# Patient Record
Sex: MALE | Race: WHITE
[De-identification: names, ages, dates, MRNs, and addresses within clinical notes are randomized per-mention and may not be internally consistent; named-entity substitution may affect disease eponyms.]

---

## 2019-08-05 ENCOUNTER — HOSPITAL ENCOUNTER (EMERGENCY)
Dept: HOSPITAL 75 - ER FS | Age: 64
Discharge: HOME | End: 2019-08-05
Payer: COMMERCIAL

## 2019-08-05 VITALS — BODY MASS INDEX: 28.14 KG/M2 | HEIGHT: 69 IN | WEIGHT: 190 LBS

## 2019-08-05 VITALS — SYSTOLIC BLOOD PRESSURE: 138 MMHG | DIASTOLIC BLOOD PRESSURE: 71 MMHG
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
## 2019-08-05 DIAGNOSIS — C79.51: ICD-10-CM

## 2019-08-05 DIAGNOSIS — C64.1: ICD-10-CM

## 2019-08-05 DIAGNOSIS — Z79.82: ICD-10-CM
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
## 2019-08-05 DIAGNOSIS — Z79.02: ICD-10-CM

## 2019-08-05 DIAGNOSIS — S20.211A: Primary | ICD-10-CM

## 2019-08-05 PROCEDURE — 71250 CT THORAX DX C-: CPT

## 2019-08-05 NOTE — ED CHEST PAIN
General


Stated Complaint:  WC CHEST INJURY


Source:  patient


Exam Limitations:  no limitations





History of Present Illness


Date Seen by Provider:  Aug 5, 2019


Time Seen by Provider:  14:36


Initial Comments


This 64-year-old man was working and got caught between a  and the wall. 

He suffered squeeze type injury of the right lower chest. He was not rendered un

conscious but continues to have some pain especially with  breathing and 

movement.


Timing/Duration:  1-3 hours


Severity/Quality:  moderate


Location:  central


Radiation:  no radiation


Activities at Onset:  none


Prior CP/Workup:  no prior chest pain


Associated Symptoms:  denies symptoms





Allergies and Home Medications


Allergies


Coded Allergies:  


     No Known Drug Allergies (Unverified , 12/18/11)





Home Medications


Acetaminophen 325 Mg Tablet, 650 MG PO Q6H PRN, (Reported)


Aspirin 81 Mg Tabec, 81 MG PO DAILY, (Reported)


Clopidogrel 75 Mg Tablet, 75 MG PO DAILY, (Reported)


Lisinopril 5 Mg Tablet, 5 MG PO HS, (Reported)


Metoprolol Succinate 25 Mg Tab.sr.24h, 25 MG PO DAILY, (Reported)


Phenylephrine/Diphenhydramine 1 Each Tablet, 1 EACH PO PRN, (Reported)


Pravastatin Sodium 20 Mg Tablet, 20 MG PO DAILY, (Reported)





Patient Home Medication List


Home Medication List Reviewed:  Yes





Review of Systems


Review of Systems


Constitutional:  no symptoms reported


Respiratory:  No Symptoms Reported, See HPI (patient is tender over his right 

anterior lower chest.)


Cardiovascular:  No Symptoms Reported, See HPI


Gastrointestinal:  No Symptoms Reported, See HPI


Genitourinary:  No Symptoms Reported, See HPI


Musculoskeletal:  see HPI, muscle pain, muscle stiffness, muscle cramps


Skin:  no symptoms reported


Psychiatric/Neurological:  No Symptoms Reported, See HPI


Endocrine:  No Symptoms Reported, See HPI


Hematologic/Lymphatic:  No Symptoms Reported, See HPI





Past Medical-Social-Family Hx


Past Med/Social Hx:  Reviewed Nursing Past Med/Soc Hx


Past Medical History


Reproductive Disorders:  No





Physical Exam


Vital Signs





Vital Signs - First Documented




















Capillary Refill :


Height, Weight, BMI


Height: '"


Weight: lbs. oz. kg;  BMI


Method:


General Appearance:  No Apparent Distress


HEENT:  PERRL/EOMI


Neck:  Full Range of Motion, Normal Inspection, Non Tender


Respiratory:  Lungs Clear, Normal Breath Sounds, No Accessory Muscle Use, Other 

(patient has tenderness to palpation over the right anterior chest. There is a 

erythematous mahin at the lower right costal margin. This red mahin measures about

48 cm by about 3 cm.)


Cardiovascular:  Regular Rate, Rhythm, No Edema, No Gallop


Gastrointestinal:  Normal Bowel Sounds, No Organomegaly, No Pulsatile Mass, Non 

Tender


Rectal:  Deferred


Extremity:  Normal Inspection, Normal Range of Motion, Non Tender


Neurologic/Psychiatric:  Alert, Oriented x3, Normal Mood/Affect


Skin:  Warm/Dry, Erythema (there is an erythematous area over the right lower 

costal margin is mentioned above.)





Progress/Results/Core Measures


Results/Orders


My Orders





Orders - CHLOE ZIMMER MD


Ct Chest Wo (8/5/19 14:34)


Ct Abdomen/Pelvis W Wo (8/5/19 15:45)


Cbc With Automated Diff (8/5/19 15:58)


Comprehensive Metabolic Panel (8/5/19 15:58)





Vital Signs/I&O











 8/5/19 8/5/19





 14:20 14:20


 


Temp 99.0 99.0


 


Pulse 107 107


 


Resp 20 20


 


B/P (MAP) 156/79 (104) 156/79 (104)


 


Pulse Ox 95 95


 


O2 Delivery Room Air Room Air











Progress


Progress Note :  


   Time:  16:03


Progress Note


This 64-year-old man was working and got caught between a  and the wall. 

He suffered squeeze type injury of the right lower chest. He was not rendered 

unconscious but continues to have some pain especially with  breathing and 

movement.


Examination reveals patient has some erythema and tenderness over his anterior 

inferior ribs.


CT scan  of the chest reveals a probable tumor involving the right kidney with 

metastasis to theL 4th rib.


I talked to the patient about doing a CT scan of the abdomen and pelvis with and

without contrast but he did not want to pursue that at this time and wanted to 

follow-up.





Departure


Impression





   Primary Impression:  


   Contusion, chest wall


   Additional Impression:  


   Neoplasm of right kidney


Disposition:  01 HOME, SELF-CARE


Condition:  Unchanged





Departure-Patient Inst.


Referrals:  


LEN SMITH MD (PCP/Family)


Primary Care Physician


Patient Instructions:  Bruised Rib, Kidney Cancer











CHLOE ZIMMER MD             Aug 5, 2019 14:41

## 2019-08-05 NOTE — XMS REPORT
Continuity of Care Document

                             Created on: 2019



Rock NOLAN Chavez

External Reference #: [id number]

: 1955

Sex: Male



Demographics







                          Address                   [address] 

[place]  [address]

 

                          Home Phone                [phone] x

 

                          Preferred Language        Unknown

 

                          Marital Status            Unknown

 

                          Zoroastrianism Affiliation     Unknown

 

                          Race                      Unknown

 

                          Ethnic Group              Unknown





Author







                          Organization              Unknown

 

                          Address                   Unknown

 

                          Phone                     Unavailable



              



Allergies

      





             Active              Description              Code              Type              Severity

                Reaction              Onset              Reported/Identified              Relationship

 to Patient                             Clinical Status        

 

                Yes              No Known Drug Allergies              [id number]              Drug Allergy

              Unknown              N/A                             2011              

                                                 



                  



Medications

      



There is no data.                  



Problems

      





             Date Dx Coded              Attending              Type              Code              Diagnosis

                                        Diagnosed By        

 

             2011                             Ot              305.1              TOBACCO USE DISORDER

                                                 

 

             2011                             Ot              410.71              AC MYOCARDIAL

 INFARCT,SUBENDO INFARCT,IN                       

 

             2011                             Ot              414.01              CORONARY ATHEROSCLEROSIS

 OF NATIVE CORON                                 

 

             2011                             Ot              414.2              CHRONIC TOTAL 

OCCLUSION OF CORONARY ROMINA                       

 

             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

 NEC                                             

 

             2011                             Ot              790.6              ABN BLOOD CHEMISTRY

 NEC                                             

 

             2011                             Ot              V03.82              PROPHYLACTIC 

VACC AGAINST STREPTOCOCCUS                        

 

             2011                             Ot              V04.81              ND FOR PROPHYLACTIC

 VACCIN AND INOCULATI                            

 

             2017                             Ot              305.1              TOBACCO USE DISORDER

                                                 

 

             2017                             Ot              411.1              INTERMED CORONARY

 SYND                                            

 

             2017                             Ot              414.01              CORONARY ATHEROSCLEROSIS

 OF NATIVE CORON                                 

 

             2017                             Ot              V17.49              FAMILY HISTORY

 OF OTHER CARDIOVASCULAR D                       

 

             2017                             Ot              V45.89              POSTSURGICAL 

STATES NEC                                       

 

             2017                             Ot              414.00              CORON ATHEROSCLER

 NOS TYPE VESSEL, NATIV                          

 

             2017                             Ot              424.0              MITRAL VALVE DISORDER

                                                 

 

             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

 NEC                                             

 

             2017                             Ot              240.9              GOITER NOS    

                                                 

 

             2017                             Ot              241.0              NONTOX UNINODULAR

 GOITER                                          

 

                2017              CECE TEJEDA ARNP              Ot              414.01     

                          CORONARY ATHEROSCLEROSIS OF NATIVE CORON                       

 

                2017              CECE TEJEDA ARNP              Ot              780.79     

                          OTH MALAISE FATIGUE                       

 

             2017                             Ot              305.1              TOBACCO USE DISORDER

                                                 

 

             2017                             Ot              411.1              INTERMED CORONARY

 SYND                                            

 

             2017                             Ot              414.01              CORONARY ATHEROSCLEROSIS

 OF NATIVE CORON                                 

 

             2017                             Ot              V17.49              FAMILY HISTORY

 OF OTHER CARDIOVASCULAR D                       

 

             2017                             Ot              V45.89              POSTSURGICAL 

STATES NEC                                       

 

             2017                             Ot              414.00              CORON ATHEROSCLER

 NOS TYPE VESSEL, NATIV                          

 

             2017                             Ot              424.0              MITRAL VALVE DISORDER

                                                 

 

             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

 NEC                                             

 

             2017                             Ot              240.9              GOITER NOS    

                                                 

 

             2017                             Ot              241.0              NONTOX UNINODULAR

 GOITER                                          

 

                2017              BAIMACECE JOHANNA ARNP              Ot              414.01     

                          CORONARY ATHEROSCLEROSIS OF NATIVE CORON                       

 

                2017              OSCRAMA CECE L ARNP              Ot              780.79     

                          OTH MALAISE FATIGUE                       

 

             2018                             Ot              240.9              GOITER NOS    

                                                 

 

             2018                             Ot              241.0              NONTOX UNINODULAR

 GOITER                                          

 

                2018              BAIMA CECE L ARNP              Ot              414.01     

                          CORONARY ATHEROSCLEROSIS OF NATIVE CORON                       

 

                2018              OSCARMA CECE L ARNP              Ot              780.79     

                          OTH MALAISE FATIGUE                       

 

             2018                             Ot              240.9              GOITER NOS    

                                                 

 

             2018                             Ot              241.0              NONTOX UNINODULAR

 GOITER                                          

 

                2018              NIKHIL CECE L ARNP              Ot              414.01     

                          CORONARY ATHEROSCLEROSIS OF NATIVE CORON                       

 

                2018              OSCARMA CECE L ARNP              Ot              780.79     

                          OTH MALAISE FATIGUE                       

 

                2018              DULCE MARIA BATEMAN FACC, SB FACP CCDS              Ot              E66.3

                          OVERWEIGHT                         

 

                2018              DULCE MARIA BATEMAN FACC, ALI FACP CCDS              Ot              E78.4

                          OTHER HYPERLIPIDEMIA                       

 

                2018              DULCE MARIA BATEMAN FACC, ALI FACP CCDS              Ot              I10

                          ESSENTIAL (PRIMARY) HYPERTENSION                       

 

                2018              DULCE MARIA BATEMAN FACC, ALI FACP CCDS              Ot              I25.10

                          ATHSCL HEART DISEASE OF NATIVE CORONARY                        

 

                2018              DULCE MARIA BATEMAN FACC, ALI FACP CCDS              Ot              I49.5

                          SICK SINUS SYNDROME                       

 

                2018              DULCE MARIA BATEMAN FACC, ALI FACP CCDS              Ot              E66.3

                          OVERWEIGHT                         

 

                2018              DULCE MARIA BATEMAN FACC, ALI FACP CCDS              Ot              E78.4

                          OTHER HYPERLIPIDEMIA                       

 

                2018              DULCE MARIA BATEMAN FACC, ALI FACP CCDS              Ot              I10

                          ESSENTIAL (PRIMARY) HYPERTENSION                       

 

                2018              DULCE MARIA BATEMAN FACC, ALI FACP CCDS              Ot              I25.10

                          ATHSCL HEART DISEASE OF NATIVE CORONARY                        

 

                2018              DULCE MARIA BATEMAN FACC, ALI FACP CCDS              Ot              I49.5

                          SICK SINUS SYNDROME                       



                                                                                
                                   



Procedures

      





                Code              Description              Performed By              Performed On     

   

 

                                      00.40                                    PROCEDURE ON SINGLE VESSEL   

                                                    2011        

 

                                      00.45                                    INSERTION OF ONE VASCULAR STENT

                                                    2011        

 

                                      00.66                                    PERCUTANEOUS TRANSLUMINAL CORONARY

 ANGIO                                                  2011        

 

                                      36.07                                    INSRT OF DRUG-ELUTING CORON ARTERY

 STENT                                                  2011        

 

                                      37.22                                    LEFT HEART CARDIAC CATH      

                                                    2011        

 

                                      88.53                                    LT HEART ANGIOCARDIOGRAM     

                                                    2011        

 

                                      88.56                                    CORONAR ARTERIOGR-2 CATH     

                                                    2011        



                              



Results

      



There is no data.              



Encounters

      





                ACCT No.              Visit Date/Time              Discharge              Status      

                Pt. Type              Provider              Facility              Loc./Unit      

                                        Complaint        

 

                [id number]              2019 16:00:00              2019 23:59:59              

CLS              Outpatient              PAUL ROMERO                                            

 

                    [id number]              2018 10:00:00              2018 23:59:59      

                CLS              Preadmit              CECE TEJEDA              [facility]              CARD                      I25.10 CAD        

 

                    [id number]              2018 07:18:00              2018 23:59:59      

                    CLS                 Outpatient              DULCE MARIA BATEMAN FACC, ALI FACP CCDS         

                    [facility]              CARD                I25.10 CAD        



 

                    [id number]              2018 07:30:00              2018 23:59:59      

                    CLS                 Preadmit              DULCE MARIA BATEMAN FACC, ALI FACP CCDS           

                    [facility]              CARD                I25.10 CAD        

 

                    [id number]              2018 08:00:00              2018 23:59:59      

                    CLS                 Preadmit              DULCE MARIA BATEMAN FACC, ALI FACP CCDS           

                    [facility]              CARD                I25.10 CAD        

 

                    [id number]              2013 06:53:00              2013 23:59:59      

                CLS              Outpatient              CECE TEJEDA              [facility]              RAD                       FATIGUE,CAD        

 

                [id number]              2012 10:48:00                                          

             Document Registration                                                                   

 

 

                [id number]              2012 09:40:00                                          

             Document Registration                                                                   

 

 

                [id number]              2012 08:23:00                                          

             Document Registration                                                                   

 

 

                [id number]              2011 12:10:00                                          

             Document Registration                                                                   

 

 

                [id number]              2011 12:10:00                                          

             Document Registration

## 2019-08-05 NOTE — DIAGNOSTIC IMAGING REPORT
PROCEDURE: CT chest without contrast.



TECHNIQUE: Multiple contiguous axial images were obtained through

the chest without the use of intravenous contrast. Auto Exposure

Controls were utilized during the CT exam to meet ALARA standards

for radiation dose reduction. 



INDICATION:  Crush injury to the chest.



COMPARISON: No prior studies are available for comparison.



FINDINGS: No mediastinal hematoma is seen. Great vessel injury

cannot be evaluated due to absence of intravenous contrast. There

are coronary arterial calcifications present. No pericardial or

pleural fluid is identified. No parenchymal contusion or

pneumothorax is seen. There is some atelectasis or scarring in

the right lower lobe. Evaluation of bony structures are without

evidence of an acute fracture. No rib fracture seen, however,

there is a lytic lesion involving the lateral left fourth rib

approximately 11 mm x 21 mm, indeterminate. No other osteolytic

lesions are seen. Upper abdomen does show a solid-appearing mass

involving the right kidney measuring approximately 6 cm.



IMPRESSION:

1. No acute chest findings are seen. 

2. Left fourth rib lytic lesion. In addition, upper abdomen does

demonstrate partial inclusion of a solid mass involving the right

kidney. Combination of findings are highly suspicious for primary

renal neoplasm with potential osteolytic metastasis of the left

fourth rib.



Dictated by: 



  Dictated on workstation # [id number]

## 2019-12-30 ENCOUNTER — HOSPITAL ENCOUNTER (OUTPATIENT)
Dept: HOSPITAL 75 - CARD | Age: 64
End: 2019-12-30
Attending: UROLOGY
Payer: MEDICARE

## 2019-12-30 DIAGNOSIS — E27.8: ICD-10-CM

## 2019-12-30 DIAGNOSIS — N28.1: Primary | ICD-10-CM

## 2019-12-30 DIAGNOSIS — K57.30: ICD-10-CM

## 2019-12-30 DIAGNOSIS — M89.9: ICD-10-CM

## 2019-12-30 DIAGNOSIS — N40.0: ICD-10-CM

## 2019-12-30 LAB
BUN/CREAT SERPL: 10
CREAT SERPL-MCNC: 1.38 MG/DL (ref 0.6–1.3)
GFR SERPLBLD BASED ON 1.73 SQ M-ARVRAT: 52 ML/MIN

## 2019-12-30 PROCEDURE — 82565 ASSAY OF CREATININE: CPT

## 2019-12-30 PROCEDURE — 74178 CT ABD&PLV WO CNTR FLWD CNTR: CPT

## 2019-12-30 PROCEDURE — 84520 ASSAY OF UREA NITROGEN: CPT

## 2019-12-30 PROCEDURE — 78306 BONE IMAGING WHOLE BODY: CPT

## 2019-12-30 PROCEDURE — 36415 COLL VENOUS BLD VENIPUNCTURE: CPT

## 2019-12-30 NOTE — DIAGNOSTIC IMAGING REPORT
INDICATION: 

Renal mass.



EXAMINATION:

Whole body bone scan.



RADIOPHARMACEUTICAL: 

26.8 mCi of technetium 99m MDP was given intravenously.



FINDINGS:

There is increased activity in the right mid iliac wing. This

corresponds to an expansile lytic lesion seen on the CT from

earlier in the day. There is a small focus of increased uptake in

the right posterior 11th rib. There are no corresponding

abnormalities seen on the CT scan. There is a small focus of

increased activity in the right anterior 6th and 7th ribs.



IMPRESSION: 

Abnormal activity in the right ilium, suspicious for metastatic

disease. There is abnormal activity in the right 6th and 7th ribs

which are suspicious for metastatic involvement although

corresponding abnormalities are not seen on the CT. There is also

a small focus of increased activity in the right posterior 11th

rib with no obvious abnormality seen on the CT.



Dictated by: 



  Dictated on workstation # [id number]

## 2019-12-30 NOTE — DIAGNOSTIC IMAGING REPORT
PROCEDURE: CT abdomen and pelvis with and without contrast.



TECHNIQUE: Precontrast acquisitions were acquired through the

abdomen and pelvis. Multiple contiguous axial images were

obtained through the abdomen and pelvis after the administration

of intravenous contrast. Auto Exposure Controls were utilized

during the CT exam to meet ALARA standards for radiation dose

reduction. 



INDICATION: Right renal mass as well as left lower quadrant pain

and flank pain.



COMPARISON: No prior studies are available for comparison.



FINDINGS: Imaging through the lung bases show some minimal

scarring or atelectasis in posterior right lower lobe. The liver

demonstrates generalized low density consistent with hepatic

steatosis. No discrete liver mass is identified. The gallbladder

is unremarkable. No biliary ductal dilatation is seen. The

pancreas and spleen are unremarkable. The right adrenal gland is

unremarkable. Left adrenal gland does contain a nodule arising

from the lateral limb measuring 13 mm in size. This lesion does

demonstrate approximately 64% absolute washout and 49% relative

washout. This is suggestive of an adrenal adenoma. There is a

heterogeneous, mixed solid and cystic mass involving the upper

pole of the right kidney measuring 6.5 cm AP x 6.3 cm transverse

x 6.7 cm cephalocaudal. The IVC appears to be patent. The right

renal vein is not well visualized. Left renal vein is patent. No

definite central retroperitoneal lymphadenopathy is seen. No

mesenteric lymphadenopathy is detected. The small and large bowel

loops are normal caliber. There is no obstruction. There is

diverticulosis of the sigmoid but no evidence of acute

diverticulitis. There is no free fluid or fluid collection

identified. The bladder is unremarkable. The prostate gland is

enlarged. Evaluation of the osseous structures demonstrates a

expansile lytic destructive lesion involving the right iliac

bone. This measures approximately 6.7 x 3.6 x 4.2 cm. This

appears to violate both posterior and anterior cortices of the

right iliac bone. This soft tissue tumor extension into the right

iliac is musculature. No other lytic lesions are seen.



IMPRESSION:

1. Large next solid and cystic mass involving the upper pole of

the right kidney, most consistent with renal cell carcinoma.

There is also a destructive, expansile lytic lesion involving the

right iliac bone with tumor extension into the right iliacus

musculature. This is suggestive of a metastatic lesion.

2. Uncomplicated diverticulosis.

3. Prostatomegaly.

4. Left adrenal nodule. Enhancement kinetics are suggestive of an

adrenal adenoma but follow-up to confirm stability would be

recommended with repeat study in six months.



Dictated by: 



  Dictated on workstation # [id number]

## 2020-04-02 ENCOUNTER — HOSPITAL ENCOUNTER (OUTPATIENT)
Dept: HOSPITAL 75 - RAD | Age: 65
End: 2020-04-02
Attending: INTERNAL MEDICINE
Payer: MEDICARE

## 2020-04-02 DIAGNOSIS — M79.605: Primary | ICD-10-CM

## 2020-04-02 NOTE — DIAGNOSTIC IMAGING REPORT
INDICATION: Left leg pain x1 week.



COMPARISON:  None



TECHNIQUE: Duplex, gray-scale and color-flow imaging of the left

lower extremity venous system was performed. 



FINDINGS: The common femoral vein, superficial femoral vein,

profunda femoris, and popliteal veins are normal.  These vessels

show normal compressibility, color flow, and doppler

augmentation.  The deep calf veins, although not very well seen,

demonstrate no distinct intraluminal thrombus. 



IMPRESSION:  Negative venous Doppler of the left lower extremity.



 



Dictated by: 



  Dictated on workstation # [id number]

## 2020-05-19 ENCOUNTER — HOSPITAL ENCOUNTER (OUTPATIENT)
Dept: HOSPITAL 75 - RAD | Age: 65
End: 2020-05-19
Attending: INTERNAL MEDICINE
Payer: MEDICARE

## 2020-05-19 DIAGNOSIS — C64.1: Primary | ICD-10-CM

## 2020-05-19 DIAGNOSIS — M84.48XA: ICD-10-CM

## 2020-05-19 PROCEDURE — 74177 CT ABD & PELVIS W/CONTRAST: CPT

## 2020-05-19 NOTE — DIAGNOSTIC IMAGING REPORT
EXAMINATION: CT Abdomen and Pelvis with intravenous contrast.



TECHNIQUE: Multiple contiguous axial images were obtained through

the abdomen and pelvis after the uneventful administration of

intravenous contrast. All CT scans use one or more of the

following dose optimizing techniques: automated exposure control,

MA and/or KvP adjustment based on a patient size and exam type,

or iterative reconstruction. 



HISTORY: Right renal cancer.



COMPARISON: 12/30/2019.



FINDINGS: 



Limited views of the lower thorax are unremarkable.



The liver is normal without focal lesion. There is no biliary

ductal dilation. Gallbladder is normal.  Pancreas is normal. 

Spleen is normal. There is an unchanged 13 mm left adrenal

nodule. Again seen is a large right renal mass measuring 6.0 x

6.0 cm, previously 6.1 x 6.2 cm. This is predominantly exophytic

and arises from the interpolar zone of the kidney. There is a

large soft tissue mass arising from the right ilium with a

maximal dimension of 4.9 cm, previously 3.6 cm. There is a

pathologic fracture of the right ilium. There is hypervascularity

of the right iliacus related to direct invasion by the tumor into

this muscle. There is no hydronephrosis. Urinary bladder is

normal.



Visualized bowel is normal in caliber without obstruction or

inflammation. No free fluid or air. No abdominal or pelvic

lymphadenopathy. Aorta is normal in caliber without aneurysm.



There is a new 8 mm sclerotic lesion in the left ilium.



IMPRESSION:



1. Stable large right renal mass.



2. Increase in size of right iliac metastatic lesion with

pathologic fracture and new sclerotic lesion in the left ilium.



Dictated by: 



  Dictated on workstation # [id number]

## 2020-06-01 ENCOUNTER — HOSPITAL ENCOUNTER (OUTPATIENT)
Dept: HOSPITAL 75 - CARD | Age: 65
End: 2020-06-01
Attending: INTERNAL MEDICINE
Payer: MEDICARE

## 2020-06-01 DIAGNOSIS — R91.8: ICD-10-CM

## 2020-06-01 DIAGNOSIS — N28.89: ICD-10-CM

## 2020-06-01 DIAGNOSIS — C61: ICD-10-CM

## 2020-06-01 DIAGNOSIS — E27.8: ICD-10-CM

## 2020-06-01 DIAGNOSIS — M89.9: ICD-10-CM

## 2020-06-01 DIAGNOSIS — C64.1: Primary | ICD-10-CM

## 2020-06-01 DIAGNOSIS — I25.10: ICD-10-CM

## 2020-06-01 PROCEDURE — 71260 CT THORAX DX C+: CPT

## 2020-06-01 PROCEDURE — 78306 BONE IMAGING WHOLE BODY: CPT

## 2020-06-01 RX ADMIN — Medication PRN ML: at 12:16

## 2020-06-01 RX ADMIN — Medication PRN ML: at 12:30

## 2020-06-01 NOTE — DIAGNOSTIC IMAGING REPORT
PROCEDURE: 

CT chest with contrast only.



TECHNIQUE: 

Multiple contiguous axial images were obtained through the chest

after administration of intravenous contrast. Auto Exposure

Controls were utilized during the CT exam to meet ALARA standards

for radiation dose reduction. 



INDICATION:  

Prostate cancer, kidney cancer.



FINDINGS:

The previous CT chest exam of 08/05/2019 failed to show any

evidence for an acute abnormality; however, there was a lytic

lesion involving the left 4th rib. On this exam, that lytic

lesion is again identified. Previously, this area measured 1.1 x

2.1 cm. This lytic lesion now measures 1.5 x 2.1 cm. This finding

should be considered neoplastic until proven otherwise.



There is no other lytic or blastic lesion noted. The Nuclear

Medicine bone scan performed on 12/30/2019 did suggest abnormal

uptake in the right 6th and 7th ribs but there is no clear

evidence for bony destruction in these areas on this exam.



There is no parenchymal lung mass identified to indicate

metastatic disease either. The mild scar formation in the right

lung base seen previously is again evident and no different.

There is no sign of failure, pneumonia, or pleural effusion to

indicate an acute abnormality.



The heart size is stable. Coronary artery calcifications are

again noted. The aorta is not abnormally dilated and there is no

sign of a dissection. The pulmonary arteries were not well

opacified and consequently difficult to assess for pulmonary

embolus. There is no definite defect to suggest a pulmonary

embolus, however.



There is no mediastinal or hilar adenopathy. The thyroid gland is

prominent and the areas of low density in the left lobe of the

thyroid seen previously are again evident and do not appear to

have changed significantly.



The sections through the upper abdomen again show a mass

involving the superior pole of the right kidney. This mass was

not visualized in its entirely but is estimated to be

approximately 6 cm. The small 1 cm exophytic nodule along the

posterior aspect of the left kidney seen previously is again

evident. This seems to be solid as well. Ultrasound would be

recommended for further evaluation. The nodule associated with

the left adrenal gland may be minimally larger than on the

previous CT abdomen/pelvis exam of 08/05/2019. This finding may

represent a benign adrenal nodule. The possibility that this is

secondary to metastatic disease should also be considered. If

further imaging is desired, then either PET/CT or MRI would be

recommended.



IMPRESSION: 

1. There is no acute cardiopulmonary abnormality identified.

There is no parenchymal lung mass noted to suggest metastatic

disease either.



2. The lytic lesion involving the left 4th rib seen previously

has increased in size and consequently should be considered

neoplastic until proven otherwise. The mass associated with the

right kidney seems stable as does the 1 cm exophytic nodule along

the posterior aspect of the left kidney. The nodule associated

with the left adrenal gland may be minimally larger, however.

Additional considerations and recommendations as above.



Dictated by: 



  Dictated on workstation # [id number]

## 2020-06-01 NOTE — DIAGNOSTIC IMAGING REPORT
EXAMINATION: Nuclear medicine bone scan.



INDICATION: Prostate cancer.



TECHNIQUE: This study was performed following administration of

27.1 mCi of 99m technetium MDP. Anterior and posterior whole body

images were obtained.



FINDINGS: The previous nuclear medicine bone scan performed on

12/30/2019 noted abnormal activity involving the right ilium.

This finding was suspicious for metastatic disease. On this exam,

there is still abnormal uptake in this region. There also appears

to be abnormal uptake in the left ischium. This is no different

than on the prior exam as well.



The previous study did show abnormal uptake along the anterior

aspects of the right sixth and seventh ribs. The uptake in this

region on this exam is much less. There is an area of increased

activity involving the left fourth rib in the region of the lytic

lesion seen in this area on the CT chest exam performed in

conjunction with this study.



There is no new area of abnormal uptake to suggest metastatic

disease. However, there is increased activity along the medial

aspect of the left knee joint. This finding was not present on

the prior exam and may be related to trauma to the left knee.

Clinical follow-up is recommended.



Both kidneys do show excretion of the radiotracer.



IMPRESSION:

1. The suspected metastatic foci involving the right ilium and

left ischium seen previously are again evident and no different.

2. There is little if any abnormal uptake involving the right

sixth and seventh ribs. There is persistent uptake of the left

fourth rib, however.

3. The abnormal uptake involving the medial aspect of the left

knee joint may be post-traumatic in nature. Clinical follow-up is

recommended. If further imaging is desired, then MRI would be

recommended.



Dictated by: 



  Dictated on workstation # [id number]

## 2020-07-21 ENCOUNTER — HOSPITAL ENCOUNTER (OUTPATIENT)
Dept: HOSPITAL 75 - CARD | Age: 65
End: 2020-07-21
Attending: INTERNAL MEDICINE
Payer: MEDICARE

## 2020-07-21 DIAGNOSIS — C64.1: Primary | ICD-10-CM

## 2020-07-21 DIAGNOSIS — E27.8: ICD-10-CM

## 2020-07-21 DIAGNOSIS — C79.51: ICD-10-CM

## 2020-07-21 PROCEDURE — 71260 CT THORAX DX C+: CPT

## 2020-07-21 PROCEDURE — 74177 CT ABD & PELVIS W/CONTRAST: CPT

## 2020-07-21 PROCEDURE — 78306 BONE IMAGING WHOLE BODY: CPT

## 2020-07-21 NOTE — DIAGNOSTIC IMAGING REPORT
INDICATION: 

Right renal neoplasm.



TECHNIQUE: 

The patient was administered 25.7 mCi of technetium 99m MDP

intravenously and whole-body imaging was performed after a 3 hour

delay.



COMPARISON: 

Correlation is made with the prior whole body bone scan from

06/01/2020.



FINDINGS:

Uptake of activity by the axial and appendicular skeleton is

noted. There is uptake by both kidneys with excretion into the

urinary bladder. A small area of uptake involving the lateral

aspect of the left approximately 4th rib is unchanged. Minimal

uptake in the lower right ribs posteriorly, best seen on the

posterior view, is stable. Mild degenerative uptake at the left

knee and bilateral shoulders is unchanged. The previously noted

uptake involving the left iliac bone and left supra-acetabular

region appears stable. A lucency at the right iliac bone near the

crest on the posterior view is stable. No new abnormality is

identified.



IMPRESSION: 

Overall stable whole body bone scan when compared with the

examination from 06/01/2020.



Dictated by: 



  Dictated on workstation # [id number]

## 2020-07-21 NOTE — DIAGNOSTIC IMAGING REPORT
EXAMINATION: CT Chest, Abdomen and Pelvis with intravenous

contrast.



TECHNIQUE: Multiple contiguous axial images were obtained through

the chest, abdomen and pelvis after the uneventful administration

of intravenous contrast. All CT scans use one or more of the

following dose optimizing techniques: automated exposure control,

MA and/or KvP adjustment based on a patient size and exam type,

or iterative reconstruction. 



HISTORY: Renal cancer.



COMPARISON: 06/01/2020 and 05/19/2020.



FINDINGS: 



There is no edema or pneumonia. No pleural effusion. No

pneumothorax. No suspicious nodules.



Heart size is normal. There are moderate coronary artery

calcifications. No pericardial effusion. Aorta is normal in

caliber. There is no axillary or supraclavicular lymphadenopathy.

There is no mediastinal lymphadenopathy. Left-sided thyroid

nodule is stable.



The liver is normal without focal lesion. There is no biliary

ductal dilation. Gallbladder is normal.  Pancreas is normal. 

Spleen is normal. There is a stable 13 mm left adrenal nodule.



The right adrenal mass is stable in size measuring 6.3 x 6.1 cm,

previously 6.1 x 6.0 cm. There is no hydronephrosis. Urinary

bladder is normal.



Visualized bowel is normal in caliber without obstruction or

inflammation. There is a fat-containing left inguinal hernia. No

free fluid or air. No abdominal or pelvic lymphadenopathy. Aorta

is normal in caliber without aneurysm.



Soft tissue mass in the right ilium is stable measuring up to 5.2

cm in size. There is a stable tiny left iliac sclerotic lesion.

Left fourth rib lesion is similar to prior study.



IMPRESSION:



1. Stable right renal mass and metastatic lesions in the left

fourth rib and right ilium.



2. Stable indeterminate left adrenal nodule and tiny sclerotic

lesion in the left ilium.



Dictated by: 



  Dictated on workstation # [id number]

## 2020-07-22 ENCOUNTER — HOSPITAL ENCOUNTER (OUTPATIENT)
Dept: HOSPITAL 75 - LABNPT | Age: 65
End: 2020-07-22
Attending: INTERNAL MEDICINE
Payer: MEDICARE

## 2020-07-22 DIAGNOSIS — Z20.828: Primary | ICD-10-CM

## 2020-07-22 PROCEDURE — 87635 SARS-COV-2 COVID-19 AMP PRB: CPT

## 2020-09-18 ENCOUNTER — HOSPITAL ENCOUNTER (OUTPATIENT)
Dept: HOSPITAL 75 - LABNPT | Age: 65
End: 2020-09-18
Attending: INTERNAL MEDICINE
Payer: MEDICARE

## 2020-09-18 DIAGNOSIS — Z11.59: Primary | ICD-10-CM

## 2020-09-18 DIAGNOSIS — C64.1: ICD-10-CM

## 2020-09-18 DIAGNOSIS — Z20.828: ICD-10-CM

## 2020-09-18 DIAGNOSIS — C61: ICD-10-CM

## 2020-09-18 PROCEDURE — 87635 SARS-COV-2 COVID-19 AMP PRB: CPT

## 2020-10-15 ENCOUNTER — HOSPITAL ENCOUNTER (OUTPATIENT)
Dept: HOSPITAL 75 - LABNPT | Age: 65
End: 2020-10-15
Attending: INTERNAL MEDICINE
Payer: MEDICARE

## 2020-10-15 DIAGNOSIS — C64.1: ICD-10-CM

## 2020-10-15 DIAGNOSIS — Z01.812: Primary | ICD-10-CM

## 2020-10-15 DIAGNOSIS — Z20.828: ICD-10-CM

## 2020-10-15 PROCEDURE — 87635 SARS-COV-2 COVID-19 AMP PRB: CPT

## 2020-11-13 ENCOUNTER — HOSPITAL ENCOUNTER (OUTPATIENT)
Dept: HOSPITAL 75 - LABNPT | Age: 65
End: 2020-11-13
Attending: INTERNAL MEDICINE
Payer: MEDICARE

## 2020-11-13 DIAGNOSIS — C64.1: ICD-10-CM

## 2020-11-13 DIAGNOSIS — Z20.828: ICD-10-CM

## 2020-11-13 DIAGNOSIS — C61: Primary | ICD-10-CM

## 2020-11-13 PROCEDURE — 87635 SARS-COV-2 COVID-19 AMP PRB: CPT

## 2020-12-04 ENCOUNTER — HOSPITAL ENCOUNTER (OUTPATIENT)
Dept: HOSPITAL 75 - CARD | Age: 65
End: 2020-12-04
Attending: INTERNAL MEDICINE
Payer: MEDICARE

## 2020-12-04 DIAGNOSIS — C64.1: ICD-10-CM

## 2020-12-04 DIAGNOSIS — C61: Primary | ICD-10-CM

## 2020-12-04 LAB
ALBUMIN SERPL-MCNC: 4.3 GM/DL (ref 3.2–4.5)
ALP SERPL-CCNC: 52 U/L (ref 40–136)
ALT SERPL-CCNC: 14 U/L (ref 0–55)
BILIRUB SERPL-MCNC: 0.3 MG/DL (ref 0.1–1)
BUN/CREAT SERPL: 14
CALCIUM SERPL-MCNC: 9.4 MG/DL (ref 8.5–10.1)
CHLORIDE SERPL-SCNC: 101 MMOL/L (ref 98–107)
CO2 SERPL-SCNC: 27 MMOL/L (ref 21–32)
CREAT SERPL-MCNC: 1.05 MG/DL (ref 0.6–1.3)
GFR SERPLBLD BASED ON 1.73 SQ M-ARVRAT: > 60 ML/MIN
GLUCOSE SERPL-MCNC: 98 MG/DL (ref 70–105)
POTASSIUM SERPL-SCNC: 4.4 MMOL/L (ref 3.6–5)
PROT SERPL-MCNC: 6.9 GM/DL (ref 6.4–8.2)
SODIUM SERPL-SCNC: 138 MMOL/L (ref 135–145)

## 2020-12-04 PROCEDURE — 74177 CT ABD & PELVIS W/CONTRAST: CPT

## 2020-12-04 PROCEDURE — 36415 COLL VENOUS BLD VENIPUNCTURE: CPT

## 2020-12-04 PROCEDURE — 71260 CT THORAX DX C+: CPT

## 2020-12-04 PROCEDURE — 78306 BONE IMAGING WHOLE BODY: CPT

## 2020-12-04 PROCEDURE — 80053 COMPREHEN METABOLIC PANEL: CPT

## 2020-12-04 NOTE — DIAGNOSTIC IMAGING REPORT
INDICATION: Prostate cancer. 



It is compared with study from an outside facility [facility] dated 10/16/2020.



TECHNIQUE: The patient received an intravenous dose 25.7 mCi Tc

99 MDP, whole-body planar imaging then performed 3 hours

following injection.



The study also interpreted in correlation with the CT study

performed 12/04/2020.



There is an abnormal uptake at the left acetabulum at the top of

the right iliac crest in the lateral aspect of the left 4th ribs

these are unchanged from the prior bone scan and correspond to

areas of bony metastatic disease seen on separately performed CT.

 Elsewhere an arthritic pattern of uptake involves the left

greater than right knees and the bilateral shoulders. Elevated

uptake in the lower left tibial shaft is of uncertain etiology,

plain film correlation recommended. Some asymmetric uptake in the

rightward mid thoracic spine corresponds to endplate osteophytes.





IMPRESSION:  

Unchanged from outside study, areas of uptake correspond to a

bony lesions present on earlier CT.  No obvious scintigraphic

evidence for disease progression.



Dictated by: 



  Dictated on workstation # [id number]

## 2020-12-04 NOTE — DIAGNOSTIC IMAGING REPORT
PROCEDURE: CT chest, abdomen, and pelvis with contrast.



TECHNIQUE: Multiple contiguous axial images were obtained through

the chest, abdomen, and pelvis after the administration of

intravenous contrast. Auto Exposure Controls were utilized during

the CT exam to meet ALARA standards for radiation dose reduction.





INDICATION: Renal carcinoma.





CORRELATION STUDY: 07/21/2020



FINDINGS:



CT CHEST:  

Thyroid gland is mildly enlarged. Low-density nodule particularly

inferior pole left lobe. No pathologically enlarged axillary,

mediastinal and/or hilar lymph nodes. Heart size is within normal

limits. There is scattered mild to moderate coronary artery

calcification.



Emphysematous change about the lung parenchyma. Asymmetric

scarring in the right lower lobe. No infiltrate. No definitive

concerning pulmonary mass.



Expansile lateral left 4th rib appears unchanged. There is 

apparent new destructive soft tissue mass at the medial right

scapula, approximately 13 mm in size.





CT ABDOMEN and PELVIS:  

Liver, gallbladder, spleen, pancreas and right adrenal gland

unchanged. 16 mm enhancing lateral left adrenal gland mass.

Large, approximately 6.7 x 6.4 cm enhancing mass superior pole

right kidney, measures slightly larger (previously 6.3 x 6.1 cm)

a few shotty central retroperitoneal lymph nodes are present. No

pathologically enlarged lymphadenopathy. Inferior vena cava and

right renal vein are unremarkable without definitive filling

defect.



Gastrointestinal tract: Colonic diverticulosis without evidence

for acute diverticulitis. Normal appendix. Prominent vasculature

in and around this mass.



Additionally, there is a small exophytic enhancing solid nodule

posterior interpolar region left kidney, 12 mm in size.



Destructive exophytic mass of the right iliac bone with

surrounding soft tissue component appears generally stable.

Additionally, there is loss of the medullary portion the left

acetabulum also suggestive of a likely osseous metastasis. Few

sclerotic lesions the iliac bone.



IMPRESSION:



CT CHEST:

1.  Generally stable metastatic lesion left lateral 4th rib.

2. Apparent new destructive soft tissue mass medial right

scapula.





CT ABDOMEN and PELVIS:

1. Stable to slightly progressive enlargement of a right renal

neoplastic mass.

2. There is also presence of a small exophytic concerning mass

left kidney as well.

3. Destructive right iliac bone metastasis and likely left

superior acetabular metastasis overall generally stable.

4. Stable appearance about avidly enhancing left adrenal gland

mass.



Dictated by: 



  Dictated on workstation # [id number]

## 2020-12-07 ENCOUNTER — HOSPITAL ENCOUNTER (OUTPATIENT)
Dept: HOSPITAL 75 - ONC | Age: 65
LOS: 63 days | Discharge: HOME | End: 2021-02-08
Attending: RADIOLOGY
Payer: MEDICARE

## 2020-12-07 DIAGNOSIS — C64.1: ICD-10-CM

## 2020-12-07 DIAGNOSIS — Z95.5: ICD-10-CM

## 2020-12-07 DIAGNOSIS — Z85.46: ICD-10-CM

## 2020-12-07 DIAGNOSIS — I25.10: ICD-10-CM

## 2020-12-07 DIAGNOSIS — E78.5: ICD-10-CM

## 2020-12-07 DIAGNOSIS — C79.51: Primary | ICD-10-CM

## 2020-12-07 DIAGNOSIS — Z87.01: ICD-10-CM

## 2020-12-07 DIAGNOSIS — I10: ICD-10-CM

## 2020-12-07 PROCEDURE — 77334 RADIATION TREATMENT AID(S): CPT

## 2020-12-07 PROCEDURE — 77417 THER RADIOLOGY PORT IMAGE(S): CPT

## 2020-12-07 PROCEDURE — 77300 RADIATION THERAPY DOSE PLAN: CPT

## 2020-12-07 PROCEDURE — 77295 3-D RADIOTHERAPY PLAN: CPT

## 2020-12-07 PROCEDURE — 77336 RADIATION PHYSICS CONSULT: CPT

## 2020-12-07 PROCEDURE — 99204 OFFICE O/P NEW MOD 45 MIN: CPT

## 2020-12-07 PROCEDURE — 77290 THER RAD SIMULAJ FIELD CPLX: CPT

## 2020-12-10 ENCOUNTER — HOSPITAL ENCOUNTER (OUTPATIENT)
Dept: HOSPITAL 75 - LABNPT | Age: 65
End: 2020-12-10
Attending: INTERNAL MEDICINE
Payer: MEDICARE

## 2020-12-10 DIAGNOSIS — Z20.828: ICD-10-CM

## 2020-12-10 DIAGNOSIS — C64.1: Primary | ICD-10-CM

## 2020-12-10 PROCEDURE — 87635 SARS-COV-2 COVID-19 AMP PRB: CPT

## 2021-01-26 ENCOUNTER — HOSPITAL ENCOUNTER (OUTPATIENT)
Dept: HOSPITAL 75 - LABNPT | Age: 66
End: 2021-01-26
Attending: INTERNAL MEDICINE
Payer: MEDICARE

## 2021-01-26 DIAGNOSIS — C64.1: Primary | ICD-10-CM

## 2021-01-26 DIAGNOSIS — Z20.822: ICD-10-CM

## 2021-01-26 PROCEDURE — 87635 SARS-COV-2 COVID-19 AMP PRB: CPT

## 2021-02-01 ENCOUNTER — HOSPITAL ENCOUNTER (OUTPATIENT)
Dept: HOSPITAL 75 - CARD | Age: 66
End: 2021-02-01
Attending: INTERNAL MEDICINE
Payer: MEDICARE

## 2021-02-01 DIAGNOSIS — C64.1: Primary | ICD-10-CM

## 2021-02-01 DIAGNOSIS — N28.89: ICD-10-CM

## 2021-02-01 PROCEDURE — 71260 CT THORAX DX C+: CPT

## 2021-02-01 PROCEDURE — 74177 CT ABD & PELVIS W/CONTRAST: CPT

## 2021-02-01 PROCEDURE — 78306 BONE IMAGING WHOLE BODY: CPT

## 2021-02-01 RX ADMIN — Medication PRN ML: at 11:39

## 2021-02-01 RX ADMIN — Medication PRN ML: at 12:57

## 2021-02-01 NOTE — DIAGNOSTIC IMAGING REPORT
INDICATION: Renal carcinoma.



TECHNIQUE: Patient was administered 27 mCi technetium 99m MDP

intravenously and whole body imaging was performed after a

three-hour delay.



COMPARISON: Correlation is made with prior whole body bone scan

from 12/04/2020.



FINDINGS: Uptake involving the left acetabulum and lateral left

fourth rib appears similar to prior exam. Expansile lesion in the

right iliac crest is unchanged. Degenerative uptake in the

midthoracic spine as well as bilateral knees is unchanged. No new

foci are seen. There is uptake in the medial aspect of the right

scapula, increased since prior exam.



IMPRESSION: Metastatic lesions in the right scapula, right iliac

crest, as well as left supra-acetabular location as well as

lateral fourth rib. These correspond to the areas noted on CT. No

new foci of osseous metastatic disease are identified.



Dictated by: 



  Dictated on workstation # [id number]

## 2021-02-01 NOTE — DIAGNOSTIC IMAGING REPORT
PROCEDURE: CT chest, abdomen, and pelvis with contrast.



TECHNIQUE: Multiple contiguous axial images were obtained through

the chest, abdomen, and pelvis after the administration of

intravenous contrast. Auto Exposure Controls were utilized during

the CT exam to meet ALARA standards for radiation dose reduction.





INDICATION: Renal cancer, follow-up.



COMPARISON: Correlation is made with prior CT from 12/04/2020.



FINDINGS:



CT chest:



No axillary lymphadenopathy is detected. No mediastinal or hilar

lymphadenopathy is detected. There is no pericardial or pleural

fluid. No pulmonary nodules, masses or infiltrates are seen.

There is some scarring or atelectasis in the posterior right

lower lobe. Expansile lesion involving the left lateral fourth

rib is again noted and appears slightly larger. The mass

involving the inferior and medial aspect of the scapula has

significantly increased in size, now measuring approximately 4.4

x 3.8 cm.



IMPRESSION: No evidence of thoracic lymphadenopathy or pulmonary

metastatic disease. Expansile left fourth rib lesion persists may

be a slightly larger on today's study. In addition, the soft

tissue mass involving the inferior and medial right scapula has

increased in size.



CT abdomen and pelvis:



No discrete liver mass is identified. Gallbladder is

unremarkable. There is no biliary ductal dilatation. The pancreas

and spleen are unremarkable. No adrenal mass is detected. The

large solid mass involving the right kidney measures

approximately 6.9 x 6.6 cm compared with 6.7 x 6.4 cm. Left

kidney again demonstrates a small exophytic hyperdense lesion 12

mm in size, stable. The aorta is non-aneurysmal. No definite

central retroperitoneal or mesenteric lymphadenopathy is seen.

Bowel loops are normal caliber. There is no ascites. There is

diverticulosis of the sigmoid but no evidence of acute

diverticulitis. No definite pelvic lymphadenopathy is seen. The

expansile mass involving the right iliac is again noted. The

lytic lesion in the left supra-acetabular region is again noted

as well. No definite new osteolytic lesion is seen.



IMPRESSION: Overall stable CT abdomen and pelvis since exam from

12/04/2020. A large solid right renal mass as well as

indeterminate exophytic left renal mass appear to be fairly

stable. The expansile osseous lesions involving the right iliac

and left supra-acetabular regions appear to be fairly stable.



Dictated by: 



  Dictated on workstation # [id number]

## 2021-03-31 ENCOUNTER — HOSPITAL ENCOUNTER (EMERGENCY)
Dept: HOSPITAL 75 - ER | Age: 66
Discharge: HOME | End: 2021-03-31
Payer: MEDICARE

## 2021-03-31 VITALS — HEIGHT: 68.9 IN | WEIGHT: 194.01 LBS | BODY MASS INDEX: 28.73 KG/M2

## 2021-03-31 VITALS — SYSTOLIC BLOOD PRESSURE: 148 MMHG | DIASTOLIC BLOOD PRESSURE: 91 MMHG

## 2021-03-31 DIAGNOSIS — Z79.82: ICD-10-CM

## 2021-03-31 DIAGNOSIS — C61: ICD-10-CM

## 2021-03-31 DIAGNOSIS — I10: Primary | ICD-10-CM

## 2021-03-31 DIAGNOSIS — Z79.899: ICD-10-CM

## 2021-03-31 DIAGNOSIS — Z79.02: ICD-10-CM

## 2021-03-31 DIAGNOSIS — Z95.5: ICD-10-CM

## 2021-03-31 DIAGNOSIS — I25.10: ICD-10-CM

## 2021-03-31 LAB
ALBUMIN SERPL-MCNC: 4.7 GM/DL (ref 3.2–4.5)
ALP SERPL-CCNC: 98 U/L (ref 40–136)
ALT SERPL-CCNC: 32 U/L (ref 0–55)
APTT BLD: 30 SEC (ref 24–35)
BASOPHILS # BLD AUTO: 0 10^3/UL (ref 0–0.1)
BASOPHILS NFR BLD AUTO: 0 % (ref 0–10)
BILIRUB SERPL-MCNC: 0.3 MG/DL (ref 0.1–1)
BUN/CREAT SERPL: 10
CALCIUM SERPL-MCNC: 9.5 MG/DL (ref 8.5–10.1)
CHLORIDE SERPL-SCNC: 100 MMOL/L (ref 98–107)
CO2 SERPL-SCNC: 29 MMOL/L (ref 21–32)
CREAT SERPL-MCNC: 1.02 MG/DL (ref 0.6–1.3)
EOSINOPHIL # BLD AUTO: 0.1 10^3/UL (ref 0–0.3)
EOSINOPHIL NFR BLD AUTO: 2 % (ref 0–10)
GFR SERPLBLD BASED ON 1.73 SQ M-ARVRAT: > 60 ML/MIN
GLUCOSE SERPL-MCNC: 97 MG/DL (ref 70–105)
HCT VFR BLD CALC: 49 % (ref 40–54)
HGB BLD-MCNC: 16.5 G/DL (ref 13.3–17.7)
INR PPP: 0.9 (ref 0.8–1.4)
LYMPHOCYTES # BLD AUTO: 1.4 10^3/UL (ref 1–4)
LYMPHOCYTES NFR BLD AUTO: 27 % (ref 12–44)
MAGNESIUM SERPL-MCNC: 2.3 MG/DL (ref 1.6–2.4)
MANUAL DIFFERENTIAL PERFORMED BLD QL: NO
MCH RBC QN AUTO: 31 PG (ref 25–34)
MCHC RBC AUTO-ENTMCNC: 34 G/DL (ref 32–36)
MCV RBC AUTO: 93 FL (ref 80–99)
MONOCYTES # BLD AUTO: 0.5 10^3/UL (ref 0–1)
MONOCYTES NFR BLD AUTO: 10 % (ref 0–12)
NEUTROPHILS # BLD AUTO: 3.1 10^3/UL (ref 1.8–7.8)
NEUTROPHILS NFR BLD AUTO: 60 % (ref 42–75)
PLATELET # BLD: 197 10^3/UL (ref 130–400)
PMV BLD AUTO: 9.3 FL (ref 9–12.2)
POTASSIUM SERPL-SCNC: 3.6 MMOL/L (ref 3.6–5)
PROT SERPL-MCNC: 7.9 GM/DL (ref 6.4–8.2)
PROTHROMBIN TIME: 12.9 SEC (ref 12.2–14.7)
SODIUM SERPL-SCNC: 139 MMOL/L (ref 135–145)
WBC # BLD AUTO: 5.1 10^3/UL (ref 4.3–11)

## 2021-03-31 PROCEDURE — 85730 THROMBOPLASTIN TIME PARTIAL: CPT

## 2021-03-31 PROCEDURE — 84484 ASSAY OF TROPONIN QUANT: CPT

## 2021-03-31 PROCEDURE — 83735 ASSAY OF MAGNESIUM: CPT

## 2021-03-31 PROCEDURE — 85025 COMPLETE CBC W/AUTO DIFF WBC: CPT

## 2021-03-31 PROCEDURE — 85610 PROTHROMBIN TIME: CPT

## 2021-03-31 PROCEDURE — 36415 COLL VENOUS BLD VENIPUNCTURE: CPT

## 2021-03-31 PROCEDURE — 83880 ASSAY OF NATRIURETIC PEPTIDE: CPT

## 2021-03-31 PROCEDURE — 83874 ASSAY OF MYOGLOBIN: CPT

## 2021-03-31 PROCEDURE — 93041 RHYTHM ECG TRACING: CPT

## 2021-03-31 PROCEDURE — 93005 ELECTROCARDIOGRAM TRACING: CPT

## 2021-03-31 PROCEDURE — 80053 COMPREHEN METABOLIC PANEL: CPT

## 2021-03-31 PROCEDURE — 71045 X-RAY EXAM CHEST 1 VIEW: CPT

## 2021-03-31 NOTE — DIAGNOSTIC IMAGING REPORT
INDICATION: Hypertension



Portable chest 7:28 PM



Heart size and pulmonary vascularity are normal. Lungs are clear.

There are no effusions or pneumothoraces. There is focal pleural

thickening left upper lateral chest from an expansile rib lesion.



IMPRESSION: Expansile rib lesion left upper lateral chest. This

is previously seen on a CT chest dated 02/01/2021.



Dictated by: 



  Dictated on workstation # [id number]

## 2021-03-31 NOTE — ED CARDIAC GENERAL
History of Present Illness


General


Chief Complaint:  Cardiac/General Problems


Stated Complaint:  HIGH BLOOD PRESSURE





History of Present Illness


Date Seen by Provider:  Mar 31, 2021


Time Seen by Provider:  19:15


Initial Comments


66-year-old  male presents for hypertension.  He has been monitoring at

home, readings from 180/120 max.  His oncology team at [facility] and his primary 

care provider have recently changed his medication and increased his lisinopril 

to 40 mg and hydrochlorothiazide 25 mg.  He started a new pain medicine for the 

prostate cancer and a side effect is hypertension.  He also noted a headache 

mainly above the right eye today.  He also reports a stressful day at work.  He 

is denying any chest pain, diaphoresis, or nausea/vomiting.


Timing/Duration:  1-2 days


Severity:  mild


NTG SL PTA:  No


ASA po PTA:  Yes


Associated Systoms:  No Chest Pain, No Cough, No Diaphoresis, No Fever/Chills; 

Headaches; No Loss of Appetite, No Malaise, No Nausea/Vomiting, No Rash, No 

Seizure, No Shortness of Air, No Syncope, No Weakness





Allergies and Home Medications


Allergies


Coded Allergies:  


     No Known Drug Allergies (Unverified , 11)





Home Medications


Acetaminophen 325 Mg Tablet, 650 MG PO Q6H PRN, (Reported)


Aspirin 81 Mg Tabec, 81 MG PO DAILY, (Reported)


Clopidogrel 75 Mg Tablet, 75 MG PO DAILY, (Reported)


Lisinopril 5 Mg Tablet, 5 MG PO HS, (Reported)


Metoprolol Succinate 25 Mg Tab.sr.24h, 25 MG PO DAILY, (Reported)


Phenylephrine/Diphenhydramine 1 Each Tablet, 1 EACH PO PRN, (Reported)


Pravastatin Sodium 20 Mg Tablet, 20 MG PO DAILY, (Reported)





Patient Home Medication List


Home Medication List Reviewed:  Yes





Review of Systems


Review of Systems


Constitutional:  no symptoms reported, see HPI


Cardiovascular:  See HPI; Denies Chest Pain, Denies Edema, Denies Irregular 

Heart Rate, Denies Lightheadedness, Denies Palpitations, Denies Syncope; Other 

(Hypertension.)


Gastrointestinal:  No Symptoms Reported, See HPI; Denies Nausea, Denies Vomiting


Psychiatric/Neurological:  See HPI, Headache





All Other Systems Reviewed


Negative Unless Noted:  Yes





Past Medical-Social-Family Hx


Past Med/Social Hx:  Reviewed Nursing Past Med/Soc Hx


Patient Social History


Type Used:  Cigarettes


2nd Hand Smoke Exposure:  No


Recent Hopitalizations:  No





Seasonal Allergies


Seasonal Allergies:  No





Past Medical History


Surgeries:  Yes (neck)


Coronary Stent


Respiratory:  No


Cardiac:  Yes (stent placement 10 years ago)


Coronary Artery Disease


Neurological:  No


Reproductive Disorders:  No


Genitourinary:  No


Gastrointestinal:  No


Musculoskeletal:  No


Endocrine:  No


HEENT:  No


Cancer:  No


Psychosocial:  No


Integumentary:  No


Blood Disorders:  No





Physical Exam


Vital Signs





Vital Signs - First Documented








 3/31/21





 19:15


 


Temp 35.7


 


Pulse 83


 


Resp 16


 


B/P (MAP) 179/108 (131)


 


O2 Delivery Room Air





Capillary Refill :


Height, Weight, BMI


Height: 5'9.00"


Weight: 190lbs. oz. 86.872960jn;  BMI


Method:Stated


General Appearance:  WD/WN, Anxious


HEENT:  PERRL/EOMI, TMs Normal, Normal ENT Inspection, Pharynx Normal


Neck:  Full Range of Motion, Normal Inspection, Non Tender, Supple


Respiratory:  Chest Non Tender, Lungs Clear, Normal Breath Sounds


Cardiovascular:  Regular Rate, Rhythm, No Edema, No JVD, No Murmur


Gastrointestinal:  Normal Bowel Sounds, Non Tender, Soft


Extremity:  Normal Capillary Refill, Normal Inspection, Normal Range of Motion, 

Non Tender, No Calf Tenderness, No Pedal Edema


Neurologic/Psychiatric:  Alert, Oriented x3, No Motor/Sensory Deficits, Normal 

Mood/Affect, CNs II-XII Norm as Tested


Skin:  Normal Color, Warm/Dry





Progress/Results/Core Measures


Results/Orders


Lab Results





Laboratory Tests








Test


 3/31/21


19:26 Range/Units


 


 


White Blood Count


 5.1 


 4.3-11.0


10^3/uL


 


Red Blood Count


 5.27 


 4.30-5.52


10^6/uL


 


Hemoglobin 16.5  13.3-17.7  g/dL


 


Hematocrit 49  40-54  %


 


Mean Corpuscular Volume 93  80-99  fL


 


Mean Corpuscular Hemoglobin 31  25-34  pg


 


Mean Corpuscular Hemoglobin


Concent 34 


 32-36  g/dL





 


Red Cell Distribution Width 12.6  10.0-14.5  %


 


Platelet Count


 197 


 130-400


10^3/uL


 


Mean Platelet Volume 9.3  9.0-12.2  fL


 


Immature Granulocyte % (Auto) 0   %


 


Neutrophils (%) (Auto) 60  42-75  %


 


Lymphocytes (%) (Auto) 27  12-44  %


 


Monocytes (%) (Auto) 10  0-12  %


 


Eosinophils (%) (Auto) 2  0-10  %


 


Basophils (%) (Auto) 0  0-10  %


 


Neutrophils # (Auto)


 3.1 


 1.8-7.8


10^3/uL


 


Lymphocytes # (Auto)


 1.4 


 1.0-4.0


10^3/uL


 


Monocytes # (Auto)


 0.5 


 0.0-1.0


10^3/uL


 


Eosinophils # (Auto)


 0.1 


 0.0-0.3


10^3/uL


 


Basophils # (Auto)


 0.0 


 0.0-0.1


10^3/uL


 


Immature Granulocyte # (Auto)


 0.0 


 0.0-0.1


10^3/uL


 


Prothrombin Time 12.9  12.2-14.7  SEC


 


INR Comment 0.9  0.8-1.4  


 


Activated Partial


Thromboplast Time 30 


 24-35  SEC





 


Sodium Level 139  135-145  MMOL/L


 


Potassium Level 3.6  3.6-5.0  MMOL/L


 


Chloride Level 100    MMOL/L


 


Carbon Dioxide Level 29  21-32  MMOL/L


 


Anion Gap 10  5-14  MMOL/L


 


Blood Urea Nitrogen 10  7-18  MG/DL


 


Creatinine


 1.02 


 0.60-1.30


MG/DL


 


Estimat Glomerular Filtration


Rate > 60 


  





 


BUN/Creatinine Ratio 10   


 


Glucose Level 97    MG/DL


 


Calcium Level 9.5  8.5-10.1  MG/DL


 


Corrected Calcium   8.5-10.1  MG/DL


 


Magnesium Level 2.3  1.6-2.4  MG/DL


 


Total Bilirubin 0.3  0.1-1.0  MG/DL


 


Aspartate Amino Transf


(AST/SGOT) 28 


 5-34  U/L





 


Alanine Aminotransferase


(ALT/SGPT) 32 


 0-55  U/L





 


Alkaline Phosphatase 98    U/L


 


Myoglobin


 99.8 H


 10.0-92.0


NG/ML


 


Troponin I < 0.028  <0.028  NG/ML


 


B-Type Natriuretic Peptide 22.9  <100.0  PG/ML


 


Total Protein 7.9  6.4-8.2  GM/DL


 


Albumin 4.7 H 3.2-4.5  GM/DL








My Orders





Orders - KYREE REN


Cbc With Automated Diff (3/31/21 19:23)


Magnesium (3/31/21 19:23)


Chest 1 View, Ap/Pa Only (3/31/21 19:23)


Ekg Tracing (3/31/21 19:23)


Comprehensive Metabolic Panel (3/31/21 19:23)


Myoglobin Serum (3/31/21 19:23)


Protime With Inr (3/31/21 19:23)


Partial Thromboplastin Time (3/31/21 19:23)


Monitor-Rhythm Ecg Trace Only (3/31/21 19:23)


Ed Iv/Invasive Line Start (3/31/21 19:23)


BNP (3/31/21 19:23)


Troponin I (3/31/21 19:23)


Acetaminophen Tablet/Caplet (Tylenol  T (3/31/21 20:00)





Vital Signs/I&O











 3/31/21





 19:15


 


Temp 35.7


 


Pulse 83


 


Resp 16


 


B/P (MAP) 179/108 (131)


 


O2 Delivery Room Air











Progress


Progress Note :  


   Time:  19:15


Progress Note


Patient seen and evaluated, initial blood pressure 180/120.  Will get labs, EKG,

and chest x-ray.


 Second blood pressure reading 150/90.  Will give Tylenol 650 mg for 

headache.


 patient reports headache has improved and declined the Tylenol.  Blood 

pressure 142/80.  Awaiting all labs and will plan for discharge.


 all labs within normal limits.  Patient continues to deny any symptoms at 

this time.  Blood pressure has normalized.  Discharge instructions and return 

precautions reviewed.


Initial ECG Impression Date:  Mar 31, 2021


Initial ECG Impression Time:  19:25


Initial ECG Rate:  78


Initial ECG Rhythm:  Normal Sinus


Initial ECG Intervals:  Normal


Initial ECG Intervals


, QRSD 100, , QTc 454.  Axis A 83, QRS 41, T 37.


Initial ECG Impression:  Normal





Diagnostic Imaging





   Diagonstic Imaging:  Xray


   Plain Films/CT/US/NM/MRI:  chest


Comments


NAME:   ROCK NOLAN VIDES


[facility] REC#:   [id number]


ACCOUNT#:   [id number]


PT STATUS:   REG ER


:   1955


PHYSICIAN:   KYREE REN


ADMIT DATE:   21/ER


                                  ***Signed***


Date of Exam:21





CHEST 1 VIEW, AP/PA ONLY








INDICATION: Hypertension





Portable chest 7:28 PM





Heart size and pulmonary vascularity are normal. Lungs are clear.


There are no effusions or pneumothoraces. There is focal pleural


thickening left upper lateral chest from an expansile rib lesion.





IMPRESSION: Expansile rib lesion left upper lateral chest. This


is previously seen on a CT chest dated 2021.





Dictated by: 





  Dictated on workstation # [id number]








Dict:   21


Trans:   21


CVB [id number]





Interpreted by:     GIACOMO SIMMONS MD


Electronically signed by: GIACOMO SIMMONS MD 21


   Reviewed:  Reviewed by Me





Departure


Impression





   Primary Impression:  


   Hypertension


   Qualified Codes:  I10 - Essential (primary) hypertension


Disposition:   HOME, SELF-CARE


Condition:  Improved





Departure-Patient Inst.


Decision time for Depature:  20:20


Referrals:  


JULIANA SALOMON MD (PCP/Family)


Primary Care Physician


Patient Instructions:  High Blood Pressure (DC)





Add. Discharge Instructions:  


Continue to take your home medications as prescribed.


Follow-up with your primary care provider or oncologist regarding your pain 

medication and blood pressure medicines.


Return to the emergency department for new, urgent healthcare needs. 





All discharge instructions reviewed with patient and/or family. Voiced 

understanding.











KYREE REN                  Mar 31, 2021 19:33

## 2021-05-04 ENCOUNTER — HOSPITAL ENCOUNTER (OUTPATIENT)
Dept: HOSPITAL 75 - RAD | Age: 66
End: 2021-05-04
Attending: INTERNAL MEDICINE
Payer: MEDICARE

## 2021-05-04 DIAGNOSIS — N28.89: ICD-10-CM

## 2021-05-04 DIAGNOSIS — C64.1: Primary | ICD-10-CM

## 2021-05-04 DIAGNOSIS — M89.9: ICD-10-CM

## 2021-05-04 DIAGNOSIS — M75.91: ICD-10-CM

## 2021-05-04 LAB
ALBUMIN SERPL-MCNC: 3.9 GM/DL (ref 3.2–4.5)
ALP SERPL-CCNC: 92 U/L (ref 40–136)
ALT SERPL-CCNC: 37 U/L (ref 0–55)
BASOPHILS # BLD AUTO: 0 10^3/UL (ref 0–0.1)
BASOPHILS NFR BLD AUTO: 0 % (ref 0–10)
BILIRUB SERPL-MCNC: 0.4 MG/DL (ref 0.1–1)
BUN/CREAT SERPL: 14
CALCIUM SERPL-MCNC: 9.1 MG/DL (ref 8.5–10.1)
CHLORIDE SERPL-SCNC: 101 MMOL/L (ref 98–107)
CO2 SERPL-SCNC: 29 MMOL/L (ref 21–32)
CREAT SERPL-MCNC: 1.03 MG/DL (ref 0.6–1.3)
EOSINOPHIL # BLD AUTO: 0.2 10^3/UL (ref 0–0.3)
EOSINOPHIL NFR BLD AUTO: 4 % (ref 0–10)
GFR SERPLBLD BASED ON 1.73 SQ M-ARVRAT: > 60 ML/MIN
GLUCOSE SERPL-MCNC: 97 MG/DL (ref 70–105)
HCT VFR BLD CALC: 48 % (ref 40–54)
HGB BLD-MCNC: 15.9 G/DL (ref 13.3–17.7)
LYMPHOCYTES # BLD AUTO: 1.2 10^3/UL (ref 1–4)
LYMPHOCYTES NFR BLD AUTO: 26 % (ref 12–44)
MANUAL DIFFERENTIAL PERFORMED BLD QL: NO
MCH RBC QN AUTO: 32 PG (ref 25–34)
MCHC RBC AUTO-ENTMCNC: 34 G/DL (ref 32–36)
MCV RBC AUTO: 94 FL (ref 80–99)
MONOCYTES # BLD AUTO: 0.6 10^3/UL (ref 0–1)
MONOCYTES NFR BLD AUTO: 12 % (ref 0–12)
NEUTROPHILS # BLD AUTO: 2.6 10^3/UL (ref 1.8–7.8)
NEUTROPHILS NFR BLD AUTO: 57 % (ref 42–75)
PLATELET # BLD: 161 10^3/UL (ref 130–400)
PMV BLD AUTO: 9.4 FL (ref 9–12.2)
POTASSIUM SERPL-SCNC: 4 MMOL/L (ref 3.6–5)
PROT SERPL-MCNC: 6.7 GM/DL (ref 6.4–8.2)
SODIUM SERPL-SCNC: 139 MMOL/L (ref 135–145)
WBC # BLD AUTO: 4.5 10^3/UL (ref 4.3–11)

## 2021-05-04 PROCEDURE — 71260 CT THORAX DX C+: CPT

## 2021-05-04 PROCEDURE — 80053 COMPREHEN METABOLIC PANEL: CPT

## 2021-05-04 PROCEDURE — 36415 COLL VENOUS BLD VENIPUNCTURE: CPT

## 2021-05-04 PROCEDURE — 85025 COMPLETE CBC W/AUTO DIFF WBC: CPT

## 2021-05-04 PROCEDURE — 74177 CT ABD & PELVIS W/CONTRAST: CPT

## 2021-05-04 NOTE — DIAGNOSTIC IMAGING REPORT
PROCEDURE: CT chest, abdomen, and pelvis with contrast.



TECHNIQUE: Multiple contiguous axial images were obtained through

the chest, abdomen, and pelvis after the administration of

intravenous contrast. Auto Exposure Controls were utilized during

the CT exam to meet ALARA standards for radiation dose reduction.





INDICATION:  Malignant neoplasm right kidney







The previous CT chest, abdomen and pelvis exam of 02/01/2021

noted a large solid mass involving the right kidney measuring 6.9

x 6.6 cm. That mass is again identified on this study. The mass

has decreased in size and now measures 5.1 x 5.2 cm. The prior

study also noted a 12 mm exophytic mass along the posterior

aspect of the left kidney. That mass is also decreased in size

and now measures 7 mm (image 125 of 256.



The previous study also identified multiple osseous metastatic

lesions. This included a roughly 5 cm expansile lesion involving

the left 4th rib. That finding is again evident and no different.

There was also a destructive mass eroding the right scapula. The

mass measured 4.4 x 3.8 cm. On this study that mass is again

identified and now measures 4.1 x 3.2 cm. The large destructive

mass eroding the right ilium seen previously is also again

visualized. This mass measures 6.5 x 4.3 cm as opposed to 7.2 x

4.9 cm on the previous study. There also appears to be a sizable

lesion involving the left ischium. This measures 3.1 x 6.3 cm as

opposed to 2.7 x 6.3 cm previously. There also continues to be

some irregularity of the articulation of the sternum in the left

1st rib. This area did show increased activity on the whole body

bone scan performed on 02/01/2021. However this may be secondary

to degenerative disease alone.



There is no new metastatic focus identified with certainty.



The images through the thorax show that the heart size is within

normal limits. There are extensive coronary calcifications noted.

The aorta is not abnormally dilated and there is no sign of

dissection. There is no defect within the pulmonary arteries to

indicate a pulmonary embolus. There is no mediastinal or hilar

adenopathy. There are several low density nodules involving both

lobes of the thyroid.. The largest of these is in the inferior

pole of the left lobe and measures 2.4 cm. These findings were

not clearly evident on the prior study. I would recommend

ultrasound of the thyroid gland be performed for further study.

The lung bases remain generally clear. There is still mild

chronic atelectasis/scar formation in the right lower lobe.



The images through the abdomen and pelvis show that the solid

organs seems similar to the prior exam. There is no evidence for

metastatic disease or for an acute abnormality.



There is diverticulosis of the sigmoid and descending colon but

there is no sign of acute diverticulitis. The urinary bladder and

prostate gland are grossly unremarkable.



Impression: 

1. The appearance of the CT chest, abdomen and pelvis exam has

improved somewhat since the prior study. In particular, the mass

involving the right kidney and the small nodule along the

posterior aspect the left kidney have decreased in size slightly.

The expansile lesions involving the right scapula and right ilium

also measures slightly smaller than on the prior exam.

2. There is no acute abnormality of the chest, abdomen or pelvis

identified.

3. Ultrasound of the thyroid gland would be recommended for

further characterization of the low density nodules in each lobe.



Dictated by: 



  Dictated on workstation # [id number]

## 2021-07-09 ENCOUNTER — HOSPITAL ENCOUNTER (OUTPATIENT)
Dept: HOSPITAL 75 - LAB | Age: 66
End: 2021-07-09
Attending: INTERNAL MEDICINE
Payer: MEDICARE

## 2021-07-09 DIAGNOSIS — C64.1: ICD-10-CM

## 2021-07-09 DIAGNOSIS — C61: Primary | ICD-10-CM

## 2021-07-09 LAB
BASOPHILS # BLD AUTO: 0 10^3/UL (ref 0–0.1)
BASOPHILS NFR BLD AUTO: 1 % (ref 0–10)
EOSINOPHIL # BLD AUTO: 0.2 10^3/UL (ref 0–0.3)
EOSINOPHIL NFR BLD AUTO: 5 % (ref 0–10)
HCT VFR BLD CALC: 41 % (ref 40–54)
HGB BLD-MCNC: 13.9 G/DL (ref 13.3–17.7)
LYMPHOCYTES # BLD AUTO: 1.2 10^3/UL (ref 1–4)
LYMPHOCYTES NFR BLD AUTO: 27 % (ref 12–44)
MANUAL DIFFERENTIAL PERFORMED BLD QL: NO
MCH RBC QN AUTO: 33 PG (ref 25–34)
MCHC RBC AUTO-ENTMCNC: 34 G/DL (ref 32–36)
MCV RBC AUTO: 98 FL (ref 80–99)
MONOCYTES # BLD AUTO: 0.5 10^3/UL (ref 0–1)
MONOCYTES NFR BLD AUTO: 11 % (ref 0–12)
NEUTROPHILS # BLD AUTO: 2.5 10^3/UL (ref 1.8–7.8)
NEUTROPHILS NFR BLD AUTO: 57 % (ref 42–75)
PLATELET # BLD: 208 10^3/UL (ref 130–400)
PMV BLD AUTO: 9 FL (ref 9–12.2)
WBC # BLD AUTO: 4.4 10^3/UL (ref 4.3–11)

## 2021-07-09 PROCEDURE — 85025 COMPLETE CBC W/AUTO DIFF WBC: CPT

## 2021-07-09 PROCEDURE — 36415 COLL VENOUS BLD VENIPUNCTURE: CPT

## 2021-07-14 ENCOUNTER — HOSPITAL ENCOUNTER (OUTPATIENT)
Dept: HOSPITAL 75 - RAD | Age: 66
End: 2021-07-14
Attending: NURSE PRACTITIONER
Payer: MEDICARE

## 2021-07-14 VITALS — BODY MASS INDEX: 27.85 KG/M2 | HEIGHT: 67.01 IN | WEIGHT: 177.47 LBS

## 2021-07-14 DIAGNOSIS — E04.2: Primary | ICD-10-CM

## 2021-07-14 PROCEDURE — 10005 FNA BX W/US GDN 1ST LES: CPT

## 2021-07-14 PROCEDURE — 10006 FNA BX W/US GDN EA ADDL: CPT

## 2021-07-14 NOTE — DIAGNOSTIC IMAGING REPORT
INDICATION: Bilateral thyroid nodules. Patient presents for

bilateral thyroid fine-needle aspiration and biopsy.



Patient brought to the procedure room placed on table in the

supine position. Ultrasound imaging of the right and left neck

was performed to evaluate appropriate entry site. The neck was

prepped and draped in the usual sterile fashion. Small amount 1%

lidocaine was utilized for local anesthesia. A total of 4 passes

were made into the solid hypoechoic nodule with

microcalcifications in the mid right lobe slightly medial

utilizing 25-gauge needles and fine-needle aspiration technique.

A single pass was made with a Rotex needle and Rotex biopsy was

performed.



Next, total of 4 passes were made into the dominant solid mass in

the lower pole left lobe of the thyroid utilizing 25-gauge

needles and fine-needle aspiration technique. Single pass was

made with a Rotex needle. Patient tolerated procedure well and

left from stable condition.



IMPRESSION: Successful ultrasound-guided fine-needle aspiration

and Rotex biopsy of right and left lobe thyroid nodules, as

described. Pathology results are currently pending.



Dictated by: 



  Dictated on workstation # [id number]

## 2021-07-15 NOTE — DIAGNOSTIC IMAGING REPORT
INDICATION: Bilateral thyroid nodules. Patient presents for

bilateral thyroid fine-needle aspiration and biopsy.



Patient brought to the procedure room placed on table in the

supine position. Ultrasound imaging of the right and left neck

was performed to evaluate appropriate entry site. The neck was

prepped and draped in the usual sterile fashion. Small amount 1%

lidocaine was utilized for local anesthesia. A total of 4 passes

were made into the solid hypoechoic nodule with

microcalcifications in the mid right lobe slightly medial

utilizing 25-gauge needles and fine-needle aspiration technique.

A single pass was made with a Rotex needle and Rotex biopsy was

performed.



Next, total of 4 passes were made into the dominant solid mass in

the lower pole left lobe of the thyroid utilizing 25-gauge

needles and fine-needle aspiration technique. Single pass was

made with a Rotex needle. Patient tolerated procedure well and

left from stable condition.



IMPRESSION: Successful ultrasound-guided fine-needle aspiration

and Rotex biopsy of right and left lobe thyroid nodules, as

described. Pathology results are currently pending.



Dictated by: 



  Dictated on workstation # [id number]

## 2021-07-20 ENCOUNTER — HOSPITAL ENCOUNTER (OUTPATIENT)
Dept: HOSPITAL 75 - RAD | Age: 66
End: 2021-07-20
Attending: PHYSICIAN ASSISTANT
Payer: MEDICARE

## 2021-07-20 DIAGNOSIS — Z85.46: ICD-10-CM

## 2021-07-20 DIAGNOSIS — N28.89: Primary | ICD-10-CM

## 2021-07-20 DIAGNOSIS — R19.09: ICD-10-CM

## 2021-07-20 DIAGNOSIS — Z85.528: ICD-10-CM

## 2021-07-20 DIAGNOSIS — E03.2: ICD-10-CM

## 2021-07-20 DIAGNOSIS — I67.89: ICD-10-CM

## 2021-07-20 DIAGNOSIS — R91.8: ICD-10-CM

## 2021-07-20 LAB
ALBUMIN SERPL-MCNC: 3.7 GM/DL (ref 3.2–4.5)
ALP SERPL-CCNC: 70 U/L (ref 40–136)
ALT SERPL-CCNC: 19 U/L (ref 0–55)
BASOPHILS # BLD AUTO: 0 10^3/UL (ref 0–0.1)
BASOPHILS NFR BLD AUTO: 1 % (ref 0–10)
BILIRUB SERPL-MCNC: 0.3 MG/DL (ref 0.1–1)
BUN/CREAT SERPL: 12
CALCIUM SERPL-MCNC: 8.9 MG/DL (ref 8.5–10.1)
CHLORIDE SERPL-SCNC: 102 MMOL/L (ref 98–107)
CO2 SERPL-SCNC: 30 MMOL/L (ref 21–32)
CREAT SERPL-MCNC: 0.84 MG/DL (ref 0.6–1.3)
EOSINOPHIL # BLD AUTO: 0.3 10^3/UL (ref 0–0.3)
EOSINOPHIL NFR BLD AUTO: 6 % (ref 0–10)
GFR SERPLBLD BASED ON 1.73 SQ M-ARVRAT: > 60 ML/MIN
GLUCOSE SERPL-MCNC: 103 MG/DL (ref 70–105)
HCT VFR BLD CALC: 40 % (ref 40–54)
HGB BLD-MCNC: 13.1 G/DL (ref 13.3–17.7)
LYMPHOCYTES # BLD AUTO: 1.2 10^3/UL (ref 1–4)
LYMPHOCYTES NFR BLD AUTO: 27 % (ref 12–44)
MANUAL DIFFERENTIAL PERFORMED BLD QL: NO
MCH RBC QN AUTO: 33 PG (ref 25–34)
MCHC RBC AUTO-ENTMCNC: 33 G/DL (ref 32–36)
MCV RBC AUTO: 100 FL (ref 80–99)
MONOCYTES # BLD AUTO: 0.5 10^3/UL (ref 0–1)
MONOCYTES NFR BLD AUTO: 10 % (ref 0–12)
NEUTROPHILS # BLD AUTO: 2.4 10^3/UL (ref 1.8–7.8)
NEUTROPHILS NFR BLD AUTO: 56 % (ref 42–75)
PLATELET # BLD: 191 10^3/UL (ref 130–400)
PMV BLD AUTO: 9.2 FL (ref 9–12.2)
POTASSIUM SERPL-SCNC: 4.4 MMOL/L (ref 3.6–5)
PROT SERPL-MCNC: 6.5 GM/DL (ref 6.4–8.2)
SODIUM SERPL-SCNC: 139 MMOL/L (ref 135–145)
WBC # BLD AUTO: 4.3 10^3/UL (ref 4.3–11)

## 2021-07-20 PROCEDURE — 36415 COLL VENOUS BLD VENIPUNCTURE: CPT

## 2021-07-20 PROCEDURE — 80053 COMPREHEN METABOLIC PANEL: CPT

## 2021-07-20 PROCEDURE — 85025 COMPLETE CBC W/AUTO DIFF WBC: CPT

## 2021-07-20 PROCEDURE — 70553 MRI BRAIN STEM W/O & W/DYE: CPT

## 2021-07-20 PROCEDURE — 71260 CT THORAX DX C+: CPT

## 2021-07-20 PROCEDURE — 84443 ASSAY THYROID STIM HORMONE: CPT

## 2021-07-20 PROCEDURE — 74177 CT ABD & PELVIS W/CONTRAST: CPT

## 2021-07-20 NOTE — DIAGNOSTIC IMAGING REPORT
PROCEDURE: MR imaging of the brain with and without contrast.



TECHNIQUE: Multiplanar, multisequence MR imaging of the brain was

performed with and without contrast.



INDICATION:  History of renal cancer and prostate cancer.

Vertigo. Evaluate for metastatic disease.



COMPARISON: none



Findings: No acute ischemia, mass, or hemorrhage. No abnormal

enhancement. Scattered T2 hyperintense signal is seen throughout

the periventricular and subcortical white matter. The ventricles

and cortical sulci are prominent. The basilar cisterns are

symmetric and unremarkable. The sellar and suprasellar regions

have a normal appearance. 



The brainstem and posterior fossa are unremarkable.



The paranasal sinuses demonstrate normal signal characteristics.

Small amount of fluid is seen in the right mastoid air cells. The

globes and orbits are symmetric and unremarkable. The scalp and

calvarium have a normal appearance.



Impression:

1. No acute ischemia, mass, or hemorrhage. No abnormal

enhancement is seen to suggest intracranial metastatic disease.

2. Generalized parenchymal volume loss with scattered chronic

microvascular disease.





Dictated by: 



  Dictated on workstation # [id number]

## 2021-07-20 NOTE — DIAGNOSTIC IMAGING REPORT
PROCEDURE: CT chest, abdomen, and pelvis with contrast.



TECHNIQUE: Multiple contiguous axial images were obtained through

the chest, abdomen, and pelvis after the administration of

intravenous contrast. Auto Exposure Controls were utilized during

the CT exam to meet ALARA standards for radiation dose reduction.





INDICATION:  Prostate cancer and renal mass. 



Compared with a CT chest, abdomen and pelvis dated 05/04/2021.



CHEST:



New groundglass opacity in the right upper and middle lobe

anteriorly is developed suspicious for nonspecific infectious

etiology. No soft tissue density, lung mass or soft tissue

pulmonary nodularity. There is no axillary, hilar or mediastinal

lymphadenopathy. An expansile lytic lesion in the medial aspect

of the right scapular blade unchanged from prior. An expansile

lytic lesion in the left 4th rib laterally is unchanged. No new

osseous pathology.



Abdomen pelvis: Heterogeneously enhancing but predominantly solid

partly exophytic mass directed posteriorly off the upper pole of

the right kidney remains strongly suggestive of renal cell

carcinoma. This mass showed a slight interval reduction in size

today is 4.9 x 4.5 cm, previously having measured 5.2 x 5.0 cm.

Renal veins and cava are patent. There is no adrenal mass.

Contralateral unobstructed left kidney is stable and normal.

Pancreas, spleen and liver is unremarkable. No bile duct

dilatation. The gallbladder normal. No perinephric or

retroperitoneal lymphadenopathy. No mesenteric mass. No ascites

or bowel obstruction. An expansile lytic lesion top of the right

iliac crest is unchanged. Lucency likely lytic deposit to the

left hemipelvis supra acetabular. Caudally to the level of the

roof without pathological fracture showed no change. No new bony

lesion. No bowel, biliary or urinary tract obstruction. No

inflammatory process. No pneumatosis or free gas. No abscess,

hematoma, ascites or fluid collection. Nonaneurysmal aortic

atherosclerosis is stable.



IMPRESSION:



Right renal mass showed further interval reduction in size.  



Stable expansile lytic bony lesions in the chest abdomen and

pelvis.   



No findings suggestive of soft tissue metastasis.  



A new groundglass opacity in the right lung suspect for

nonspecific inflammatory or infectious etiologies.



Dictated by: 



  Dictated on workstation # [id number]

## 2021-08-02 ENCOUNTER — HOSPITAL ENCOUNTER (OUTPATIENT)
Dept: HOSPITAL 75 - RAD | Age: 66
End: 2021-08-02
Attending: INTERNAL MEDICINE
Payer: MEDICARE

## 2021-08-02 DIAGNOSIS — M79.662: ICD-10-CM

## 2021-08-02 DIAGNOSIS — M79.89: ICD-10-CM

## 2021-08-02 DIAGNOSIS — C61: Primary | ICD-10-CM

## 2021-08-02 NOTE — DIAGNOSTIC IMAGING REPORT
PROCEDURE: US left lower extremity venous.



TECHNIQUE: Multiple real-time grayscale images were obtained over

the left lower extremity in various projections. Additional

duplex Doppler and color Doppler images were also obtained.



INDICATION: Pain and swelling.



FINDINGS: The left common femoral, superficial femoral, popliteal

veins and tibial veins demonstrate normal response to

compression, augmentation, and Valsalva. There are no abnormal

left lower extremity fluid collections or masses.



IMPRESSION: No evidence of deep venous thrombosis in the left

lower extremity.



Dictated by: 



  Dictated on workstation # [id number]

## 2021-08-05 ENCOUNTER — HOSPITAL ENCOUNTER (OUTPATIENT)
Dept: HOSPITAL 75 - LABNPT | Age: 66
End: 2021-08-05
Attending: INTERNAL MEDICINE
Payer: MEDICARE

## 2021-08-05 DIAGNOSIS — Z20.822: ICD-10-CM

## 2021-08-05 DIAGNOSIS — C64.1: Primary | ICD-10-CM

## 2021-08-05 PROCEDURE — 87635 SARS-COV-2 COVID-19 AMP PRB: CPT

## 2021-08-08 ENCOUNTER — HOSPITAL ENCOUNTER (EMERGENCY)
Dept: HOSPITAL 75 - ER | Age: 66
End: 2021-08-08
Payer: MEDICARE

## 2021-08-08 VITALS — WEIGHT: 177.47 LBS | HEIGHT: 70 IN | BODY MASS INDEX: 25.41 KG/M2

## 2021-08-08 VITALS — SYSTOLIC BLOOD PRESSURE: 125 MMHG | DIASTOLIC BLOOD PRESSURE: 80 MMHG

## 2021-08-08 DIAGNOSIS — Z79.899: ICD-10-CM

## 2021-08-08 DIAGNOSIS — I25.10: ICD-10-CM

## 2021-08-08 DIAGNOSIS — F17.210: ICD-10-CM

## 2021-08-08 DIAGNOSIS — E78.00: ICD-10-CM

## 2021-08-08 DIAGNOSIS — Z79.01: ICD-10-CM

## 2021-08-08 DIAGNOSIS — Z79.82: ICD-10-CM

## 2021-08-08 DIAGNOSIS — I10: ICD-10-CM

## 2021-08-08 DIAGNOSIS — C64.9: Primary | ICD-10-CM

## 2021-08-08 DIAGNOSIS — I26.99: ICD-10-CM

## 2021-08-08 DIAGNOSIS — Z20.822: ICD-10-CM

## 2021-08-08 LAB
ALBUMIN SERPL-MCNC: 3.7 GM/DL (ref 3.2–4.5)
ALP SERPL-CCNC: 67 U/L (ref 40–136)
ALT SERPL-CCNC: 16 U/L (ref 0–55)
APTT BLD: 20 SEC (ref 24–35)
ARTERIAL PATENCY WRIST A: (no result)
BASE EXCESS STD BLDA CALC-SCNC: 1.3 MMOL/L (ref -2.5–2.5)
BASOPHILS # BLD AUTO: 0 10^3/UL (ref 0–0.1)
BASOPHILS NFR BLD AUTO: 0 % (ref 0–10)
BDY SITE: (no result)
BILIRUB SERPL-MCNC: 0.5 MG/DL (ref 0.1–1)
BODY TEMPERATURE: 96.6
BUN/CREAT SERPL: 16
CALCIUM SERPL-MCNC: 9.1 MG/DL (ref 8.5–10.1)
CHLORIDE SERPL-SCNC: 102 MMOL/L (ref 98–107)
CO2 BLDA CALC-SCNC: 26.9 MMOL/L (ref 21–31)
CO2 SERPL-SCNC: 27 MMOL/L (ref 21–32)
CREAT SERPL-MCNC: 0.83 MG/DL (ref 0.6–1.3)
D DIMER PPP FEU-MCNC: 3.33 UG/ML (ref 0–0.49)
EOSINOPHIL # BLD AUTO: 0.1 10^3/UL (ref 0–0.3)
EOSINOPHIL NFR BLD AUTO: 2 % (ref 0–10)
GFR SERPLBLD BASED ON 1.73 SQ M-ARVRAT: 92 ML/MIN
GLUCOSE SERPL-MCNC: 117 MG/DL (ref 70–105)
HCT VFR BLD CALC: 39 % (ref 40–54)
HGB BLD-MCNC: 13.1 G/DL (ref 13.3–17.7)
INHALED O2 FLOW RATE: (no result) L/MIN
INR PPP: 0.9 (ref 0.8–1.4)
LYMPHOCYTES # BLD AUTO: 1.1 10^3/UL (ref 1–4)
LYMPHOCYTES NFR BLD AUTO: 21 % (ref 12–44)
MANUAL DIFFERENTIAL PERFORMED BLD QL: NO
MCH RBC QN AUTO: 35 PG (ref 25–34)
MCHC RBC AUTO-ENTMCNC: 33 G/DL (ref 32–36)
MCV RBC AUTO: 104 FL (ref 80–99)
MONOCYTES # BLD AUTO: 0.7 10^3/UL (ref 0–1)
MONOCYTES NFR BLD AUTO: 14 % (ref 0–12)
NEUTROPHILS # BLD AUTO: 3.4 10^3/UL (ref 1.8–7.8)
NEUTROPHILS NFR BLD AUTO: 63 % (ref 42–75)
PCO2 BLDA: 42 MMHG (ref 35–45)
PH BLDA: 7.4 [PH] (ref 7.37–7.43)
PLATELET # BLD: 149 10^3/UL (ref 130–400)
PMV BLD AUTO: 9.8 FL (ref 9–12.2)
PO2 BLDA: 73 MMHG (ref 79–93)
POTASSIUM SERPL-SCNC: 3.9 MMOL/L (ref 3.6–5)
PROT SERPL-MCNC: 6.4 GM/DL (ref 6.4–8.2)
PROTHROMBIN TIME: 13 SEC (ref 12.2–14.7)
SAO2 % BLDA FROM PO2: 95 % (ref 94–100)
SODIUM SERPL-SCNC: 140 MMOL/L (ref 135–145)
VENTILATION MODE VENT: NO
WBC # BLD AUTO: 5.4 10^3/UL (ref 4.3–11)

## 2021-08-08 PROCEDURE — 85379 FIBRIN DEGRADATION QUANT: CPT

## 2021-08-08 PROCEDURE — 36415 COLL VENOUS BLD VENIPUNCTURE: CPT

## 2021-08-08 PROCEDURE — 71045 X-RAY EXAM CHEST 1 VIEW: CPT

## 2021-08-08 PROCEDURE — 85025 COMPLETE CBC W/AUTO DIFF WBC: CPT

## 2021-08-08 PROCEDURE — 71275 CT ANGIOGRAPHY CHEST: CPT

## 2021-08-08 PROCEDURE — 87040 BLOOD CULTURE FOR BACTERIA: CPT

## 2021-08-08 PROCEDURE — 82805 BLOOD GASES W/O2 SATURATION: CPT

## 2021-08-08 PROCEDURE — 83605 ASSAY OF LACTIC ACID: CPT

## 2021-08-08 PROCEDURE — 93005 ELECTROCARDIOGRAM TRACING: CPT

## 2021-08-08 PROCEDURE — 80053 COMPREHEN METABOLIC PANEL: CPT

## 2021-08-08 PROCEDURE — 85730 THROMBOPLASTIN TIME PARTIAL: CPT

## 2021-08-08 PROCEDURE — 85610 PROTHROMBIN TIME: CPT

## 2021-08-08 PROCEDURE — 84484 ASSAY OF TROPONIN QUANT: CPT

## 2021-08-08 PROCEDURE — 84145 PROCALCITONIN (PCT): CPT

## 2021-08-08 PROCEDURE — 87636 SARSCOV2 & INF A&B AMP PRB: CPT

## 2021-08-08 RX ADMIN — NITROGLYCERIN PRN MG: 0.4 TABLET SUBLINGUAL at 16:37

## 2021-08-08 RX ADMIN — NITROGLYCERIN PRN MG: 0.4 TABLET SUBLINGUAL at 16:26

## 2021-08-08 NOTE — ED COUGH/URI
General


Chief Complaint:  Cough/Cold/Flu Symptoms


Stated Complaint:  COUGH, SORE THROAT, SOB


Source:  patient


Exam Limitations:  no limitations





History of Present Illness


Date Seen by Provider:  Aug 8, 2021


Time Seen by Provider:  14:34


Initial Comments


Patient to the ER by private conveyance with chief complaint of 1 week of sore 

throat malaise and some mild dyspnea on exertion without fever.  He had a 

negative Covid test on Thursday, 3 days prior to arrival.  Today however he 

started to get some chest pain worse with deep inspiration which remind him of 

when he was diagnosed with pleurisy many years ago.  Pains mostly on the right 

side.  Has had nagging, nonproductive cough for the past day.  No fevers or 

chills.  No nausea vomiting or diarrhea.  No known sick contacts.  He has a 

history of coronary disease with a stent known to Dr. Galicia.  He sees oncology 

at [facility] for stage IV renal cell carcinoma and a spot on the top of his lung.  He

had a CT with contrast recently.  He had a Covid vaccine in March with Moderna.





CT of the chest abdomen pelvis 2021, 18 days ago.  Right renal mass showed 

further interval reduction in size.  Stable expansile lytic bone lesions in the 

chest abdomen and pelvis.  No soft tissue metastasis.  Groundglass opacity in 

the right lung suspect for nonspecific inflammatory or infectious etiologies.





Allergies and Home Medications


Allergies


Coded Allergies:  


     No Known Drug Allergies (Unverified , 11)





Home Medications


Acetaminophen 325 Mg Tablet, 650 MG PO Q6H PRN, (Reported)


Aspirin 81 Mg Tabec, 81 MG PO DAILY, (Reported)


Clopidogrel 75 Mg Tablet, 75 MG PO DAILY, (Reported)


Lisinopril 5 Mg Tablet, 5 MG PO HS, (Reported)


Metoprolol Succinate 25 Mg Tab.sr.24h, 25 MG PO DAILY, (Reported)


Phenylephrine/Diphenhydramine 1 Each Tablet, 1 EACH PO PRN, (Reported)


Pravastatin Sodium 20 Mg Tablet, 20 MG PO DAILY, (Reported)





Patient Home Medication List


Home Medication List Reviewed:  Yes





Review of Systems


Review of Systems


Constitutional:  No chills, No diaphoresis


EENTM:  No ear discharge, No ear pain


Respiratory:  cough, dyspnea on exertion; No phlegm; short of breath; No 

wheezing


Cardiovascular:  see HPI, chest pain; No edema; Hx of Intervention, vascular 

heart diseas


Gastrointestinal:  No abdominal pain, No nausea, No vomiting


Genitourinary:  No discharge, No dysuria


Musculoskeletal:  No back pain, No joint pain


Skin:  No pruritus, No rash


Psychiatric/Neurological:  Denies Headache, Denies Numbness





All Other Systems Reviewed


Negative Unless Noted:  Yes





Past Medical-Social-Family Hx


Patient Social History


Tobacco Use?:  Yes


Tobacco type used:  Cigarettes


Smoking Status:  Current Everyday Smoker (Pack per day)


Use of E-Cig and/or Vaping dev:  No


Substance use?:  No





Seasonal Allergies


Seasonal Allergies:  No





Past Medical History


Surgeries:  Yes (neck)


Coronary Stent


Respiratory:  No


Cardiac:  Yes (stent placement 10 years ago)


Coronary Artery Disease, High Cholesterol, Hypertension


Neurological:  No


Reproductive Disorders:  No


Genitourinary:  No


Gastrointestinal:  No


Musculoskeletal:  No


Endocrine:  No


HEENT:  No


Cancer:  Yes


Prostate, Bone, Kidney


Psychosocial:  No


Integumentary:  No


Blood Disorders:  No





Physical Exam





Vital Signs - First Documented








 21





 15:30


 


Temp 36.4


 


Pulse 91


 


Resp 18


 


B/P (MAP) 143/86 (105)


 


Pulse Ox 98





Capillary Refill :


Height: 5'9.00"


Weight: 190lbs. oz. 86.069045jx; 27.78 BMI


Method:Stated


General Appearance:  WD/WN, mild distress


Eyes:  Bilateral Eye Normal Inspection, Bilateral Eye PERRL, Bilateral Eye EOMI


HEENT:  PERRL/EOMI, normal ENT inspection, pharynx normal


Neck:  full range of motion, supple, normal inspection


Respiratory:  lungs clear, normal breath sounds, respiratory distress (24 to 26 

breaths/min, 93% after ambulating to room, 96% while at rest.), decreased breath

sounds (Bilateral); No rales, No wheezing


Cardiovascular:  normal peripheral pulses, regular rate, rhythm, no edema, 

tachycardia (106 after ambulation, down to the 80s while at rest)


Gastrointestinal:  non tender, soft


Neurologic/Psychiatric:  alert, normal mood/affect, oriented x 3


Skin:  normal color, warm/dry





Focused Exam


Lactate Level


21 14:55: Lactic Acid Level 1.14





Lactic Acid Level





Laboratory Tests








Test


 21


14:55


 


Lactic Acid Level


 1.14 MMOL/L


(0.50-2.00)











Progress/Results/Core Measures


Suspected Sepsis


SIRS


Temperature: 


Pulse:  


Respiratory Rate: 


 


Laboratory Tests


21 14:55: White Blood Count 5.4


Blood Pressure  / 


Mean: 


 





21 14:55: Lactic Acid Level 1.14


Laboratory Tests


21 14:55: 


Creatinine 0.83, Platelet Count 149, Total Bilirubin 0.5


21 16:05: INR Comment 0.9








Results/Orders


Lab Results





Laboratory Tests








Test


 21


14:37 21


14:38 21


14:55 21


16:05 Range/Units


 


 


Influenza Type A (RT-PCR) Not Detected     Not Detecte  


 


Influenza Type B (RT-PCR) Not Detected     Not Detecte  


 


SARS-CoV-2 RNA (RT-PCR) Not Detected     Not Detecte  


 


Blood Gas Puncture Site  R RAD     


 


Blood Gas Patient Temperature  96.6     


 


Arterial Blood pH  7.40    7.37-7.43  


 


Arterial Blood Partial


Pressure CO2 


 42 


 


 


 35-45  MMHG





 


Arterial Blood Partial


Pressure O2 


 73 L


 


 


 79-93  MMHG





 


Arterial Blood HCO3  26    23-27  MMOL/L


 


Arterial Blood Total CO2


 


 26.9 


 


 


 21.0-31.0


MMOL/L


 


Arterial Blood Oxygen


Saturation 


 95 


 


 


   %





 


Arterial Blood Base Excess


 


 1.3 


 


 


 -2.5-2.5


MMOL/L


 


Liam Test  YES-POS     


 


Blood Gas Ventilator Setting  NO     


 


Blood Gas Inspired Oxygen  RA     


 


White Blood Count


 


 


 5.4 


 


 4.3-11.0


10^3/uL


 


Red Blood Count


 


 


 3.78 L


 


 4.30-5.52


10^6/uL


 


Hemoglobin   13.1 L  13.3-17.7  g/dL


 


Hematocrit   39 L  40-54  %


 


Mean Corpuscular Volume   104 H  80-99  fL


 


Mean Corpuscular Hemoglobin   35 H  25-34  pg


 


Mean Corpuscular Hemoglobin


Concent 


 


 33 


 


 32-36  g/dL





 


Red Cell Distribution Width   13.7   10.0-14.5  %


 


Platelet Count


 


 


 149 


 


 130-400


10^3/uL


 


Mean Platelet Volume   9.8   9.0-12.2  fL


 


Immature Granulocyte % (Auto)   0    %


 


Neutrophils (%) (Auto)   63   42-75  %


 


Lymphocytes (%) (Auto)   21   12-44  %


 


Monocytes (%) (Auto)   14 H  0-12  %


 


Eosinophils (%) (Auto)   2   0-10  %


 


Basophils (%) (Auto)   0   0-10  %


 


Neutrophils # (Auto)


 


 


 3.4 


 


 1.8-7.8


10^3/uL


 


Lymphocytes # (Auto)


 


 


 1.1 


 


 1.0-4.0


10^3/uL


 


Monocytes # (Auto)


 


 


 0.7 


 


 0.0-1.0


10^3/uL


 


Eosinophils # (Auto)


 


 


 0.1 


 


 0.0-0.3


10^3/uL


 


Basophils # (Auto)


 


 


 0.0 


 


 0.0-0.1


10^3/uL


 


Immature Granulocyte # (Auto)


 


 


 0.0 


 


 0.0-0.1


10^3/uL


 


Sodium Level   140   135-145  MMOL/L


 


Potassium Level   3.9   3.6-5.0  MMOL/L


 


Chloride Level   102     MMOL/L


 


Carbon Dioxide Level   27   21-32  MMOL/L


 


Anion Gap   11   5-14  MMOL/L


 


Blood Urea Nitrogen   13   7-18  MG/DL


 


Creatinine


 


 


 0.83 


 


 0.60-1.30


MG/DL


 


Estimat Glomerular Filtration


Rate 


 


 92 


 


  





 


BUN/Creatinine Ratio   16    


 


Glucose Level   117 H    MG/DL


 


Lactic Acid Level


 


 


 1.14 


 


 0.50-2.00


MMOL/L


 


Calcium Level   9.1   8.5-10.1  MG/DL


 


Corrected Calcium   9.3   8.5-10.1  MG/DL


 


Total Bilirubin   0.5   0.1-1.0  MG/DL


 


Aspartate Amino Transf


(AST/SGOT) 


 


 16 


 


 5-34  U/L





 


Alanine Aminotransferase


(ALT/SGPT) 


 


 16 


 


 0-55  U/L





 


Alkaline Phosphatase   67     U/L


 


Troponin I   < 0.028   <0.028  NG/ML


 


Total Protein   6.4   6.4-8.2  GM/DL


 


Albumin   3.7   3.2-4.5  GM/DL


 


Procalcitonin   0.04   <0.10  NG/ML


 


Prothrombin Time    13.0  12.2-14.7  SEC


 


INR Comment    0.9  0.8-1.4  


 


Activated Partial


Thromboplast Time 


 


 


 20 L


 24-35  SEC





 


D-Dimer


 


 


 


 3.33 H


 0.00-0.49


UG/ML








My Orders





Orders - JEOVANNY TAMAYO


Ed Iv/Invasive Line Start (21 14:46)


Ns Iv 1000 Ml (Sodium Chloride 0.9%) (21 15:00)


Cbc With Automated Diff (21 14:46)


Comprehensive Metabolic Panel (21 14:46)


Blood Culture (21 14:46)


Sputum Culture (21 14:46)


Urinalysis (21 14:46)


Urine Culture (21 14:46)


Protime With Inr (21 14:46)


Partial Thromboplastin Time (21 14:46)


Chest 1 View, Ap/Pa Only (21 14:46)


Ed Iv/Invasive Line Start (21 14:46)


Ed Iv/Invasive Line Start (21 14:46)


Ekg Tracing (21 14:46)


Troponin I (21 14:46)


Vital Signs Adult Sepsis Patie Q15M (21 14:46)


O2 (21 14:46)


Remove Rings In Anticipation O (21 14:46)


Lactic Acid Analyzer (21 14:46)


Covid 19 Inhouse Test (21 14:46)


Procalcitonin (Pct) (21 14:46)


Fibrin Degradation Products (21 14:46)


Influenza A And B By Pcr (21 14:46)


Arterial Blood Gas (21 15:12)


Aspirin Chewable Tablet (Baby Aspirin Ch (21 16:30)


Nitroglycerin 0.4 Mg Btl 25's (Nitrostat (21 16:30)


Morphine  Injection (Morphine  Injection (21 16:51)


Ct Angio Chest W (21 16:51)


Iohexol Injection (Omnipaque 350 Mg/Ml 1 (21 17:15)


Ns (Ivpb) (Sodium Chloride 0.9% Ivpb Bag (21 17:15)


Sodium Chloride Flush (Catheter Flush Sy (21 17:15)


Received Contrast (Hold Metformin- Contr (21 17:15)


Apixaban Tablet (Eliquis Tablet) (21 18:30)





Medications Given in ED





Current Medications








 Medications  Dose


 Ordered  Sig/Rosalba


 Route  Start Time


 Stop Time Status Last Admin


Dose Admin


 


 Aspirin  324 mg  ONCE  ONCE


 PO  21 16:30


 21 16:31 DC 21 16:25


324 MG


 


 Iohexol  100 ml  ONCE  ONCE


 IV  21 17:15


 21 17:16 DC 21 17:57


71 ML


 


 Nitroglycerin  0.4 mg  AS NEEDED  PRN


 SL  21 16:30


    21 16:37


0.4 MG


 


 Sodium Chloride  10 ml  AS NEEDED  PRN


 IV  21 17:15


    21 17:57


10 ML


 


 Sodium Chloride  100 ml  ONCE  ONCE


 IV  21 17:15


 21 17:16 DC 21 17:57


80 ML








Vital Signs/I&O











 21





 15:30


 


Temp 36.4


 


Pulse 91


 


Resp 18


 


B/P (MAP) 143/86 (105)


 


Pulse Ox 98





Capillary Refill :


Progress Note #1:  


   Time:  14:56


Progress Note


Pleuritic type chest pain could be from a pneumonia as seen on CT from 18 days 

ago, Covid, heart failure leading to an atypical angina.  Plan to swab him get 

some labs chest x-ray.  Pulmonary embolism is a little less likely if the 

pathology lines up with an infectious etiology given his history of groundglass 

pattern on CT previously.  We will consider doing CT angiogram if we cannot find

a better option to explain all of his symptoms.  He has mild borderline 

hypoxemia on ambulation/exertion but his work of breathing starts to approach 

baseline while at rest.


Progress Note #2:  


   Time:  16:50


Progress Note


Patient does have a likely right lower lobe infiltrate but this could also be 

related to pulmonary embolism given his history of cancer, no fevers, no white 

count or left shift, no significant rise in procalcitonin.  He still has modest 

elevation of heart rate with any action and oxygen saturation 93% on room air at

rest 22 breaths/min.  Plan to trial some morphine 4 mg for his pain since 

nitroglycerin did not help his pain.  Because of his history of stent he is not 

a good candidate for NSAIDs.





ECG


Initial ECG Impression Date:  Aug 8, 2021


Initial ECG Impression Time:  15:15


Initial ECG Rate:  77


Initial ECG Intervals:  QT (472)


Initial ECG Impression:  Normal, Nonspecific Changes


Initial ECG Comparisson:  Unchanged


Comment


Normal sinus rhythm with one half block of ST depression in the inferior leads 

II, 3, aVF which is conserved from previous EKGs in 2021.





Diagnostic Imaging





   Diagonstic Imaging:  Xray


   Plain Films/CT/US/NM/MRI:  chest


Comments


                 [email or web address]


                                [place], [place]





NAME:   ROCK NOLAN VIDES


[facility] REC#:   [id number]


ACCOUNT#:   [id number]


PT STATUS:   REG ER


:   1955


PHYSICIAN:   JEOVANNY TAMAYO MD


ADMIT DATE:   21/ER


                                   ***Draft***


Date of Exam:21





CHEST 1 VIEW, AP/PA ONLY








INDICATION: Sepsis.





EXAMINATION: Chest, 2021.





COMPARISON: 2021.





FINDINGS: There is a vague infiltrate at the right lung base with


remaining lungs clear. Heart and pulmonary vasculature are


normal. No pneumothorax. No effusion. There are old rib fractures


laterally in the left upper chest similar to previous imaging.





IMPRESSION: Likely infiltrate at the right lung base. 





  Dictated on workstation # [id number]








Dict:   21 1618


Trans:   21 1627


[facility] [id number]





Interpreted by:     MEERA ARANA MD


Electronically signed by:


   Reviewed:  Reviewed by Me








   Diagonstic Imaging:  CT


   Plain Films/CT/US/NM/MRI:  chest


Comments


Bilateral pulmonary embolus without evidence of right-sided heart failure, 

saddle thrombus.


   Reviewed:  Reviewed by Me





Departure


Impression





   Primary Impression:  


   Pulmonary embolism


   Qualified Codes:  I26.94 - Multiple subsegmental pulmonary emboli without 

   acute cor pulmonale


Disposition:   HOME, SELF-CARE


Condition:  Stable





Departure-Patient Inst.


Decision time for Depature:  18:18


Referrals:  


JULIANA SALOMON MD (PCP/Family)


Primary Care Physician


Patient Instructions:  Pulmonary Embolism (Blood Clot in the Lungs) (DC)





Add. Discharge Instructions:  


Eliquis twice a day until told otherwise by your oncologist.


Keep your follow-up appointment with your oncologist tomorrow.


All discharge instructions reviewed with patient and/or family. Voiced 

understanding.


Scripts


Apixaban (Eliquis) 5 Mg Tablet


5 MG PO BID for 30 Days, #60 TAB 0 Refills


   Prov: JEOVANNY TAMAYO         21











JEOVANNY TAMAYO                  Aug 8, 2021 14:55

## 2021-08-08 NOTE — DIAGNOSTIC IMAGING REPORT
PROCEDURE: CT angiography of the chest with contrast.



TECHNIQUE: Multiple contiguous axial images were obtained through

the chest after uneventful bolus administration of intravenous

contrast. 3D reconstructed CTA MIP acquisitions were also

performed.

Auto Exposure Controls were utilized during the CT exam to meet

ALARA standards for radiation dose reduction. 

 

INDICATION: 66-year-old male with sore throat, cough, right-sided

rib pain, stage IV RCC with prostate cancer and METS. Suspected

pulmonary embolism.



COMPARISON: 07/20/2021.



FINDINGS: There are a few shotty benign-appearing axillary nodes

but no evidence of axillary adenopathy. There is no also no hilar

or mediastinal adenopathy. Cardiac contour is normal. Thoracic

aortic contour is also normal with no evidence of aneurysm or

dissection. Coronary calcifications are seen. The pulmonary

outflow tract is patent. However, there are intraluminal filling

defects in the distal aspect of the right and left pulmonary

arteries with the filling defects extending into the upper and

lower lobe pulmonary arteries as well as the right middle lobe

pulmonary artery. This is more prominent on the right. There

appears to be a near normal RV/LV ratio at this time. Lungs show

a few patchy alveolar infiltrates but no confluent

consolidations. There is no effusion or pneumothorax. No discrete

lung nodule seen. Bone windows do show expansile lesion involving

the lateral aspect of the left 4th rib as would be seen with a

metastatic RCC. Similarly, the expansile destructive bony lesion

involving the right scapula is also noted.



IMPRESSION:

1. Findings are consistent with a pulmonary embolism with

intraluminal thrombus seen in the the upper and lower lobe

pulmonary arteries as well as the right middle lobe pulmonary

artery. The pulmonary outflow track however is patent. The RV/LV

ratio appears to be near normal.

2. A few patchy alveolar infiltrates but no significant

consolidations.

3. No discrete lung nodule seen.

4. Bony metastatic changes are seen primarily involving the left

4th rib and right scapula. 



Dictated by: 



  Dictated on workstation # [id number]

## 2021-08-08 NOTE — DIAGNOSTIC IMAGING REPORT
INDICATION: Sepsis.



EXAMINATION: Chest, 08/08/2021.



COMPARISON: 08/31/2021.



FINDINGS: There is a vague infiltrate at the right lung base with

remaining lungs clear. Heart and pulmonary vasculature are

normal. No pneumothorax. No effusion. There are old rib fractures

laterally in the left upper chest similar to previous imaging.



IMPRESSION: Likely infiltrate at the right lung base. 



Dictated by: 



  Dictated on workstation # [id number]

## 2021-08-09 NOTE — XMS REPORT
Encounter Summary

                             Created on: 2021



Rock NOLAN Chavez

External Reference #: [id number]

: 1955

Sex: Male



Demographics





                          Address                   [address]

[place]  [address]

 

                          Home Phone                [phone]

 

                          Preferred Language        English

 

                          Marital Status            

 

                          Tenriism Affiliation     [id number]

 

                          Race                      White

 

                          Ethnic Group              Not  or 





Author





                          Author                    [facility]

 

                          Organization              [facility]

 

                          Address                   Unknown

 

                          Phone                     Unavailable







Support





                Name            Relationship    Address         Phone

 

                Ashlyn MCKEON            Unknown         [phone]







Care Team Providers





                    Care Team Member Name Role                Phone

 

                    Tawil, Elias A MD   Unavailable         [phone]

 

                    Jocy Loomis MD Unavailable         [phone]

 

                    Husam Dailey MD    Unavailable         [phone]

 

                    Self, Referral      PCP                 Unavailable

 

                    Jocy Loomis MD Unavailable         [phone]







Encounter Details





                          Care Team                 Description



                     Date                Type                Department  

 

                                                     



                           2021                Travel   







Social History





                                        Date



                 Tobacco Use     Types           Packs/Day       Years Used 

 

                                         



                 Current Every Day Smoker  Cigarettes      1.5             30 

 

    



                     Smokeless Tobacco: Former  Snuff               Quit: 



                                         User   







                    Drinks/Week         oz/Week             Comments



                                         Alcohol Use   

 

                                        



6 Cans of beer            6.0                        



                                         Yes   







 



                           Sex Assigned at Birth     Date Recorded

 

 



                                         Not on file 







                                        Date Recorded



                           COVID-19 Exposure         Response 

 

                                        2021  8:46 AM CDT



                           In the last month, have you been in contact with  No 

/ Unsure 



                                         someone who was confirmed or suspected 

to have  



                                         Coronavirus / COVID-19?  



documented as of this encounter



Functional Status





                                        Date of Assessment



                           Functional Status         Response 

 

                                        2020



                           Does the patient have a hearing impairment:  No 

 

                                        2020



                           Does the patient have a visual impairment:  No 

 

                                        2020



                           Does the patient have impaired ambulation:  Yes 

 

                                        2020



                           Does the patient have an activity of daily living  No

 



                                         (ADL) impairment:  

 

                                        2020



                           Does the patient have an instrumental activity of  No

 



                                         daily living (IADL) impairment:  







                                        Date of Assessment



                           Cognitive Status          Response 

 

                                        2020



                           Does the patient have a cognitive impairment:  No 



documented as of this encounter



Plan of Treatment





                          Care Team                 Description



                     Date                Type                Specialty  

 

                                        



Jocy Loomis MD



[address] [facility] [place] [address]



[phone] (Fax)                       



                     2021          Hospital            Oncology  



                                         Encounter   



documented as of this encounter



Visit Diagnoses

Not on filedocumented in this encounter



Additional Health Concerns





 



                           Assessment                Noted Time

 

 



                           A fall risk assessment has been completed for the pat

ient  2021  9:56 AM 

CDT

 

 



                           PHQ-2 Depression Total Score: 2  2020 10:40 AM 

CST



documented as of this encounter

## 2021-08-09 NOTE — XMS REPORT
Encounter Summary

                             Created on: 2021



Rock NOLAN Chavez

External Reference #: [id number]

: 1955

Sex: Male



Demographics





                          Address                   [address] [facility]

[place]  [address]

 

                          Home Phone                [phone]

 

                          Preferred Language        English

 

                          Marital Status            

 

                          Hinduism Affiliation     [id number]

 

                          Race                      White

 

                          Ethnic Group              Not  or 





Author





                          Author                    [facility]

 

                          Organization              [facility]

 

                          Address                   Unknown

 

                          Phone                     Unavailable







Support





                Name            Relationship    Address         Phone

 

                Ashlyn MCKEON            Unknown         [phone]







Care Team Providers





                    Care Team Member Name Role                Phone

 

                    Tawil, Elias A MD   Unavailable         [phone]

 

                    Jocy Loomis MD Unavailable         [phone]

 

                    Husam Dailey MD    Unavailable         [phone]

 

                    Self, Referral      PCP                 Unavailable

 

                    Jocy Loomis MD Unavailable         [phone]







Reason for Visit

* 



 



                           Reason                    Comments

 

 



                                         Follow Up 









Encounter Details





                          Care Team                 Description



                     Date                Type                Department  

 

                                        



Jocy Loomis MD



[address] [facility]



[place], [place] 



[phone] (Fax)                      Arrived



                     2021          Office Visit        Oncology: [facility]  



                                         [address]  



                                         [place] [address]  



                                         [phone]  







Social History





                                        Date



                 Tobacco Use     Types           Packs/Day       Years Used 

 

                                         



                 Current Every Day Smoker  Cigarettes      1.5             30 

 

    



                     Smokeless Tobacco: Former  Snuff               Quit: 1985



                                         User   







                    Drinks/Week         oz/Week             Comments



                                         Alcohol Use   

 

                                        



6 Cans of beer            6.0                        



                                         Yes   







 



                           Sex Assigned at Birth     Date Recorded

 

 



                                         Not on file 







                                        Date Recorded



                           COVID-19 Exposure         Response 

 

                                        2021  2:29 PM CDT



                           In the last month, have you been in contact with  No 

/ Unsure 



                                         someone who was confirmed or suspected 

to have  



                                         Coronavirus / COVID-19?  



documented as of this encounter



Last Filed Vital Signs





                    Reading             Time Taken          Comments



                                         Vital Sign   

 

                    110/70              2021  3:32 PM CDT  



                                         Blood Pressure   

 

                    110                 2021  3:32 PM CDT  



                                         Pulse   

 

                    36.4 C (97.5 F) 2021  3:32 PM CDT  



                                         Temperature   

 

                    24                  2021  3:32 PM CDT  



                                         Respiratory Rate   

 

                    94%                 2021  3:32 PM CDT  



                                         Oxygen Saturation   

 

                    -                   -                    



                                         Inhaled Oxygen   



                                         Concentration   

 

                    85.1 kg (187 lb 9.6 oz) 2021  3:32 PM CDT  



                                         Weight   

 

                    171.6 cm (5' 7.56") 2021  3:32 PM CDT  



                                         Height   

 

                    28.9                2021  3:32 PM CDT  



                                         Body Mass Index   



documented in this encounter



Functional Status





                                        Date of Assessment



                           Functional Status         Response 

 

                                        2020



                           Does the patient have a hearing impairment:  No 

 

                                        2020



                           Does the patient have a visual impairment:  No 

 

                                        2020



                           Does the patient have impaired ambulation:  Yes 

 

                                        2020



                           Does the patient have an activity of daily living  No

 



                                         (ADL) impairment:  

 

                                        2020



                           Does the patient have an instrumental activity of  No

 



                                         daily living (IADL) impairment:  







                                        Date of Assessment



                           Cognitive Status          Response 

 

                                        2020



                           Does the patient have a cognitive impairment:  No 



documented as of this encounter



Plan of Treatment





                          Care Team                 Description



                     Date                Type                Specialty  

 

                                        



Jocy Loomis MD



[address] [place], [place] [id number] [phone] (Fax)                       



                     2021          Hospital            Oncology  



                                         Encounter   



documented as of this encounter



Visit Diagnoses

Not on filedocumented in this encounter



Historical Medications

* This list may reflect changes made after this encounter.







                          Start Date                End Date



                 Medication      Sig             Dispensed       Refills  

 

                          2021                 



                     ELIQUIS 5 mg tablet  Take 5 mg by        0  



                                         mouth daily.    



added in this encounter



Additional Health Concerns





 



                           Assessment                Noted Time

 

 



                           PHQ-2 Depression Total Score: 2  2020 10:40 AM 

CST



documented as of this encounter

## 2021-08-09 NOTE — XMS REPORT
Encounter Summary

                             Created on: 2021



Scott  NOLAN

External Reference #: [id number]

: 1955

Sex: Male



Demographics





                          Address                   [address]  [address]

 

                          Home Phone                [phone]

 

                          Preferred Language        English

 

                          Marital Status            

 

                          Jew Affiliation     1073

 

                          Race                      White

 

                          Ethnic Group              Not  or 





Author





                          Author                    [facility]

 

                          Organization              [facility]

 

                          Address                   Unknown

 

                          Phone                     Unavailable







Support





                Name            Relationship    Address         Phone

 

                Ashlyn MCKEON            Unknown         [phone]







Care Team Providers





                    Care Team Member Name Role                Phone

 

                    Tawil, Elias A MD   Unavailable         [phone]

 

                    Jocy Loomis MD Unavailable         [phone]

 

                    Husam Dailey MD    Unavailable         [phone]

 

                    Self, Referral      PCP                 Unavailable

 

                    Jocy Loomis MD Unavailable         [phone]







Reason for Visit

* 



 



                           Reason                    Comments

 

 



                                         Heme/Onc Care 









Encounter Details





                          Care Team                 Description



                     Date                Type                Department  

 

                                        



Faye Montano, MERCED                  



                     2021          Clinical            [facility]  



                           Telehealth                [address] [place] [id number] [phone]  







Social History





                                        Date



                 Tobacco Use     Types           Packs/Day       Years Used 

 

                                         



                 Current Every Day Smoker  Cigarettes      1.5             30 

 

    



                     Smokeless Tobacco: Former  Snuff               Quit: 



                                         User   







                    Drinks/Week         oz/Week             Comments



                                         Alcohol Use   

 

                                        



6 Cans of beer            6.0                        



                                         Yes   







 



                           Sex Assigned at Birth     Date Recorded

 

 



                                         Not on file 







                                        Date Recorded



                           COVID-19 Exposure         Response 

 

                                        2021  8:46 AM CDT



                           In the last month, have you been in contact with  No 

/ Unsure 



                                         someone who was confirmed or suspected 

to have  



                                         Coronavirus / COVID-19?  



documented as of this encounter



Functional Status





                                        Date of Assessment



                           Functional Status         Response 

 

                                        2020



                           Does the patient have a hearing impairment:  No 

 

                                        2020



                           Does the patient have a visual impairment:  No 

 

                                        2020



                           Does the patient have impaired ambulation:  Yes 

 

                                        2020



                           Does the patient have an activity of daily living  No

 



                                         (ADL) impairment:  

 

                                        2020



                           Does the patient have an instrumental activity of  No

 



                                         daily living (IADL) impairment:  







                                        Date of Assessment



                           Cognitive Status          Response 

 

                                        2020



                           Does the patient have a cognitive impairment:  No 



documented as of this encounter



Progress Notes

* Faye Montano, PHARMD - 2021  3:16 PM CDT



Formatting of this note is different from the original.

    Oral Chemotherapy Reassessment Note



Appropriateness of Therapy



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
sveta.  Cycle 1 start date was in 3/2021. The regimen of cabozantinib 40 mg by joshua
th daily is appropriate. He has noted an array of symptoms in the past month and
cabozantinib will be held for the next few days to determine if cabozantinib is 
contributing (see Adverse Effects Assessment). 



No renal or hepatic dose adjustment is required at this time. To be determined i
f Mr. Chavez requires a dose reduction for his current adverse effects. 



Treatment will continue until progression or unacceptable toxicity.  



CBC w diff  

Lab Results 

Component Value Date/Time 

 WBC 4.6 2021 04:49 PM 

 RBC 5.08 2021 04:49 PM 

 HGB 16.3 2021 04:49 PM 

 HCT 47.6 2021 04:49 PM 

 MCV 93.6 2021 04:49 PM 

 MCH 32.1 2021 04:49 PM 

 MCHC 34.3 2021 04:49 PM 

 RDW 14.9 2021 04:49 PM 

 PLTCT 184 2021 04:49 PM 

 MPV 7.7 2021 04:49 PM 

 Lab Results 

Component Value Date/Time 

 NEUT 60 2021 04:49 PM 

 ANC 2.80 2021 04:49 PM 

 LYMA 25 2021 04:49 PM 

 ALC 1.10 2021 04:49 PM 

 DOLORES 9 2021 04:49 PM 

 AMC 0.40 2021 04:49 PM 

 EOSA 5 2021 04:49 PM 

 AEC 0.20 2021 04:49 PM 

 BASA 1 2021 04:49 PM 

 ABC 0.00 2021 04:49 PM 

 



Comprehensive Metabolic Profile  

Lab Results 

Component Value Date/Time 

  (L) 2021 09:00 AM 

 K 3.9 2021 09:00 AM 

 CL 99 2021 09:00 AM 

 CO2 31 (H) 2021 09:00 AM 

 GAP 5 2021 09:00 AM 

 BUN 11 2021 09:00 AM 

 CR 1.09 2021 09:00 AM 

  (H) 2021 09:00 AM 

 Lab Results 

Component Value Date/Time 

 CA 9.2 2021 09:00 AM 

 PO4 3.4 2021 09:00 AM 

 ALBUMIN 4.1 2021 09:00 AM 

 TOTPROT 6.7 2021 09:00 AM 

 ALKPHOS 73 2021 09:00 AM 

 AST 28 2021 09:00 AM 

 ALT 28 2021 09:00 AM 

 TOTBILI 0.4 2021 09:00 AM 

 GFR >60 2021 09:00 AM 

 GFRAA >60 2021 09:00 AM 

 



      Serum creatinine: 1.09 mg/dL 21 0900

Estimated creatinine clearance: 68.9 mL/min



Response to Therapy



The electronic medical record for Rock NOLAN Chavez has been reviewed. No evidence
of progression that would necessitate a change of therapy has been identified. 
Patient will continue therapy as he is achieving therapeutic benefit. 



Adverse Effects Assessment



Mr. Chavez is having the following adverse effects: fatigue, dizziness, headac
he, sinus infection, confusion, nausea. These symptoms appeared approximately 1 
month ago around the time of his first zoledronic acid injection. He was working
up until the injection and has noted a significant decline in his performance s
tatus since administration. Of note, he has also been diagnosed with an ear infe
ction and is currently on antibiotics (amoxicillin). At this time it is difficul
t to determine the exact cause of his constellation of symptoms (cabozantinib, z
oledronic acid acute phase reaction, sinus infection, brain metastasis). Plan is
to hold his cabozantinib for the next few days to see if any symptoms improve - 
 will call the healthcare on Friday to provide an update. An MRI-H has been 
ordered since the patient has reported headache, dizziness, and nausea. However,
none of the adverse effects were severe enough to interfere with adherence. 



Adherence Assessment



The patient's ability to self-administer medication was assessed. 



Mr. Chavez reports missing 0 doses over the past 3-4 week(s) not related to to
xicity. Mr. Chavez was re-educated on importance of adherence. 



Medication Reconciliation



A medication history and reconciliation was performed (including prescription me
dications, supplements, over the counter medications, and herbal products). The 
medication list was updated and the patients current medication list is inclu
ded below. 



Home Medications  

Medication Sig 

aspirin EC (ASPIR-81) 81 mg tablet Take 81 mg by mouth every 24 hours. 

atorvastatin (LIPITOR) 10 mg tablet Take one tablet by mouth daily. 

cabozantinib (CABOMETYX) 40 mg tablet Take one tablet by mouth daily. Take on an
empty stomach, at least 1 hour before or 2 hours after food. 

Calcium-Cholecalciferol (D3) (CALCIUM 500+D) 500 mg(1,250mg) -400 unit chew Chew
1 tablet by mouth twice daily. 

clobetasoL (TEMOVATE) 0.05 % topical cream Please apply a small amount to the pa
lms of hands or soles of feet twice per day if you have soreness in spite of OTC
lotion 

hydroCHLOROthiazide (HYDRODIURIL) 25 mg tablet Take one tablet by mouth every mo
rning. 

HYDROcodone/acetaminophen (NORCO) 5/325 mg tablet Take two tablets by mouth ever
y 4 hours as needed for Pain  May cause constipation; if causes constipation, ca
n take Senna as a laxative 

lisinopriL (ZESTRIL) 40 mg tablet Take one tablet by mouth daily. 

morphine SR (MS CONTIN) 30 mg ER tablet Take one tablet by mouth every 12 hours 

omeprazole DR (PRILOSEC) 40 mg capsule Take one capsule by mouth daily before br
eakfast. 

prochlorperazine (COMPAZINE) 5 mg tablet Take one tablet by mouth every 6 hours 
as needed for Nausea. 

sertraline (ZOLOFT) 50 mg tablet Take one tablet by mouth daily. 



Drug-drug and drug-food interactions between the patients specialty medicatio
n and their medication list were assessed and reviewed with the patient. 



No significant drug-drug interactions were identified. 



Mr. Chavez was instructed to speak with his health care provider and/or the or
al chemotherapy pharmacist before starting any new drug, including prescription 
or over the counter, natural / herbal products, or vitamins.  



Allergies 

Allergen Reactions 

 Ciprofloxacin NAUSEA ONLY and SEE COMMENTS 

  Nausea and sore throat 



Vaccination Status Assessment 

Immunization History 

Administered Date(s) Administered 

 COVID-19 (MODERNA), mRNA vacc, 100 mcg/0.5 mL (PF) 2021, 2021 

 Flu Vaccine =>66 YO High-Dose (PF) 2019 



Appropriate recommended vaccinations were reviewed and discussed with the jose carlos brandno. The patient will be reminded about the importance of receiving an annual infl
uenza vaccine as indicated.  



Reproductive Risk Assessment



Mr. Chavez is a 66 y.o. male



As patient is a male, education was provided at education regarding adequate con
traception for female partners of reproductive potential and contacting his phys
ician immediately should his partner become pregnant. 



Risk Evaluation and Mitigation Strategy (REMS) Assessment



No REMS is required for this medication.



What to do with any unused or  medications



Appropriate safe handling and disposal procedures were reviewed with the patient
. Mr. Chavez was instructed to return any unused or  oral chemotherapy 
medication to a designated disposal bin at one of the [facility]
s or to utilize a community drug take back program.  Instructed not to flush charly
n the toilet or to crush the medication.  



Follow-up Plan 



Mr. Chavez was encouraged to call the oral chemotherapy pharmacist at [phone] with questions. This medication is considered high risk per our inter
nal oral chemotherapy risk categorization and the patient will be contacted for 
education, toxicity check at 2 weeks, and reassessment every 3 months, if applic
able (high risk monitoring).  



Re-assessment has been completed. Next reassessment planned for 3 month(s). 



Faye Montano PHARMD

Oncology Clinical Pharmacist

2021



Electronically signed by Faye Montano PHARMD at 2021  3:47 PM CDT

documented in this encounter



Plan of Treatment





                          Care Team                 Description



                     Date                Type                Specialty  

 

                                        



Jocy Loomis MD



[address] [place] [address]



[phone] (Fax)                       



                     2021          Hospital            Oncology  



                                         Encounter   



documented as of this encounter



Visit Diagnoses

Not on filedocumented in this encounter



Additional Health Concerns





 



                           Assessment                Noted Time

 

 



                           A fall risk assessment has been completed for the pat

ient  2021  2:34 PM 

CDT

 

 



                           PHQ-2 Depression Total Score: 2  2020 10:40 AM 

CST



documented as of this encounter

## 2021-08-09 NOTE — XMS REPORT
Encounter Summary

                             Created on: 2021



Rock NOLAN Chavez

External Reference #: [id number]

: 1955

Sex: Male



Demographics





                          Address                   [address] [facility]

[place]  [address]

 

                          Home Phone                [phone]

 

                          Preferred Language        English

 

                          Marital Status            

 

                          Congregation Affiliation     1073

 

                          Race                      White

 

                          Ethnic Group              Not  or 





Author





                          Author                    [facility]

 

                          Organization              [facility]

 

                          Address                   Unknown

 

                          Phone                     Unavailable







Support





                Name            Relationship    Address         Phone

 

                Ashlyn MCKEON            Unknown         [phone]







Care Team Providers





                    Care Team Member Name Role                Phone

 

                    Tawil, Elias A MD   Unavailable         [phone]

 

                    Jocy Loomis MD Unavailable         [phone]

 

                    Husam Dailey MD    Unavailable         [phone]

 

                    Self, Referral      PCP                 Unavailable

 

                    Jocy Loomis MD Unavailable         [phone]







Encounter Details





                          Care Team                 Description



                     Date                Type                Department  

 

                                        



Krystina Hidalgo PA-C



[address] [facility]



[place], [place] 



[phone] (Fax)                      Malignant neoplasm of right kidney (HCC)

 (Primary Dx); 

Prostate cancer (HCC)



                     2021          Lab Only            Oncology: [facility] [address]  



                                         [place], [place]   



                                         [phone]  







Social History





                                        Date



                 Tobacco Use     Types           Packs/Day       Years Used 

 

                                         



                 Current Every Day Smoker  Cigarettes      1.5             30 

 

    



                     Smokeless Tobacco: Former  Snuff               Quit: 



                                         User   







                    Drinks/Week         oz/Week             Comments



                                         Alcohol Use   

 

                                        



6 Cans of beer            6.0                        



                                         Yes   







 



                           Sex Assigned at Birth     Date Recorded

 

 



                                         Not on file 







                                        Date Recorded



                           COVID-19 Exposure         Response 

 

                                        2021  8:46 AM CDT



                           In the last month, have you been in contact with  No 

/ Unsure 



                                         someone who was confirmed or suspected 

to have  



                                         Coronavirus / COVID-19?  



documented as of this encounter



Functional Status





                                        Date of Assessment



                           Functional Status         Response 

 

                                        2020



                           Does the patient have a hearing impairment:  No 

 

                                        2020



                           Does the patient have a visual impairment:  No 

 

                                        2020



                           Does the patient have impaired ambulation:  Yes 

 

                                        2020



                           Does the patient have an activity of daily living  No

 



                                         (ADL) impairment:  

 

                                        2020



                           Does the patient have an instrumental activity of  No

 



                                         daily living (IADL) impairment:  







                                        Date of Assessment



                           Cognitive Status          Response 

 

                                        2020



                           Does the patient have a cognitive impairment:  No 



documented as of this encounter



Plan of Treatment





                          Care Team                 Description



                     Date                Type                Specialty  

 

                                        



Jocy Loomis MD



[address] [place] [address] [phone] (Fax)                       



                     2021          Hospital            Oncology  



                                         Encounter   



documented as of this encounter



Procedures





                                        Comments



                 Procedure Name  Priority        Date/Time       Associated Diag

nosis 

 

                                        



Results for this procedure are in the results section.



                 HC 25-OH VITAMIN D  Routine         2021      Malignant n

eoplasm of 



                           9:00 AM CDT               right kidney (HCC) 

 

                                        



Results for this procedure are in the results section.



                 HC TESTOSTERONE;TOTAL  Routine         2021      Prostate

 cancer (HCC) 



                           9:00 AM CDT               Malignant neoplasm of 



                                         right kidney (HCC) 

 

                                        



Results for this procedure are in the results section.



                 HC PROSTATIC SPECIF  Routine         2021      Prostate c

ancer (HCC) 



                     AG(PSA);TOT         9:00 AM CDT         Malignant neoplasm 

of 



                                         right kidney (HCC) 

 

                                        



Results for this procedure are in the results section.



                 HC PHOSPHOROUS, SERUM  Routine         2021      Malignan

t neoplasm of 



                           9:00 AM CDT               right kidney (HCC) 

 

                                        



Results for this procedure are in the results section.



                 HC MAGNESIUM    Routine         2021      Malignant neopl

asm of 



                           9:00 AM CDT               right kidney (HCC) 

 

                                        



Results for this procedure are in the results section.



                 HC COMPREHENSIVE  Routine         2021      Malignant kylee

plasm of 



                     METABOLIC PANEL     9:00 AM CDT         right kidney (HCC) 



documented in this encounter



Results

* TESTOSTERONE,TOTAL (2021  9:00 AM CDT)



    



              Component    Value        Ref Range    Performed At  Pathologist



                                         Signature

 

    



                 Testosterone,To  <10 (L)         270 - 1070 NG/DL  [facility] 



                                         robert    











                                         Specimen

 





                                         Blood







   



                 Performing Organization  Address         City/State/ZIP Code  P

titus Number

 

   



                     [facility]         [address]

 [address] 





* PROSTATIC SPECIFIC ANTIGEN-PSA (2021  9:00 AM CDT)



    



              Component    Value        Ref Range    Performed At  Pathologist



                                         Signature

 

    



                 Prostatic       0.50            <4.01 NG/ML     [facility] 



                           Specific                  Comment:   



                           Antigen                   REFERENCE RANGES   



                                         AGE   PSA VALUE   



                                         <50    <=1.5   



                                         50-54    <=2.0   



                                         55-59    <=3.0   



                                         60-69    <=4.0   



                                         70+    <=6.0   











                                         Specimen

 





                                         Blood







   



                 Performing Organization  Address         City/[facility]/ZIP Code  P

titus Number

 

   



                      [facility]         [address] [place] [address] 





* 25-OH VITAMIN D (D2 + D3) (2021  9:00 AM CDT)



    



              Component    Value        Ref Range    Performed At  Pathologist



                                         [facility]

 

    



                 Vitamin         41.1            30 - 80 NG/ML    [facility] 



                                         D(25-OH)Total    











                                         Specimen

 





                                         Blood







   



                 Performing Organization  Address         City/[facility]/ZIP Code  P

titus Number

 

   



                      [facility]         [address] [place] [address] 





* PHOSPHORUS (2021  9:00 AM CDT)



    



              Component    Value        Ref Range    Performed At  Pathologist



                                         Signature

 

    



                 Phosphorus      3.4             2.0 - 4.5 MG/DL  [facility] 











                                         Specimen

 





                                         Blood







   



                 Performing Organization  Address         City/[facility]/ZIP Code  P

titus Number

 

   



                     [facility]            [address] [place] [address] 





* MAGNESIUM (2021  9:00 AM CDT)



    



              Component    Value        Ref Range    Performed At  Pathologist



                                         Signature

 

    



                 Magnesium       1.8             1.6 - 2.6 mg/dL  [facility] 











                                         Specimen

 





                                         Blood







   



                 Performing Organization  Address         City/[facility]/ZIP Code  P

titus Number

 

   



                     [facility]            [address] [place] [address] 





* COMPREHENSIVE METABOLIC PANEL (2021  9:00 AM CDT)



    



              Component    Value        Ref Range    Performed At  Pathologist



                                         [facility]

 

    



                 Sodium          135 (L)         137 - 147 MMOL/L  [facility] 

 

    



                 Potassium       3.9             3.5 - 5.1 MMOL/L  [facility] 

 

    



                 Chloride        99              98 - 110 MMOL/L  [facility] 

 

    



                 Glucose         107 (H)         70 - 100 MG/DL  [facility] 

 

    



                 Blood Urea      11              7 - 25 MG/DL    [facility] 



                                         Nitrogen    

 

    



                 Creatinine      1.09            0.4 - 1.24 MG/DL  [facility] 

 

    



                 Calcium         9.2             8.5 - 10.6 MG/DL  [facility] 

 

    



                 Total Protein   6.7             6.0 - 8.0 G/DL  [facility] 

 

    



                 Total Bilirubin  0.4             0.3 - 1.2 MG/DL  [facility] 

 

    



                 Albumin         4.1             3.5 - 5.0 G/DL  [facility] 

 

    



                 Alk Phosphatase  73              25 - 110 U/L    [facility] 

 

    



                 AST (SGOT)      28              7 - 40 U/L      [facility] 

 

    



                 CO2             31 (H)          21 - 30 MMOL/L  [facility] 

 

    



                 ALT (SGPT)      28              7 - 56 U/L      [facility] 

 

    



                 Anion Gap       5               3 - 12          [facility] 

 

    



                 eGFR Non        >60             >60 mL/min      [facility] 



                                              Comment:   



                           American                  The eGFR is not validated f

or   



                                         use in drug dosing   



                                         adjustments. Continue to use   



                                         estimated creatinine   



                                         clearance per dosing reference   



                                         text. Please contact the   



                                         Clinical Pharmacist for   



                                         questions.   

 

    



                 eGFR     >60             >60 mL/min      [facility] 



                           American                  Comment:   



                                         The eGFR is not validated for   



                                         use in drug dosing   



                                         adjustments. Continue to use   



                                         estimated creatinine   



                                         clearance per dosing reference   



                                         text. Please contact the   



                                         Clinical Pharmacist for   



                                         questions.   











                                         Specimen

 





                                         Blood







   



                 Performing Organization  Address         City/State/ZIP Code  P

titus Number

 

   



                     [facility]            [address] [place] [address] 





documented in this encounter



Visit Diagnoses









                                         Diagnosis

 





                                         Malignant neoplasm of right kidney (HCC

) - Primary

 





                                         Prostate cancer (HCC)



                                         Malignant neoplasm of prostate



documented in this encounter



Additional Health Concerns





 



                           Assessment                Noted Time

 

 



                           A fall risk assessment has been completed for the pat

ient  2021  9:56 AM 

CDT

 

 



                           PHQ-2 Depression Total Score: 2  2020 10:40 AM 

CST



documented as of this encounter

## 2021-08-09 NOTE — XMS REPORT
Encounter Summary

                             Created on: 2021



Rock NOLAN Chavez

External Reference #: [id number]

: 1955

Sex: Male



Demographics





                          Address                   [address]  [address]

 

                          Home Phone                [phone]

 

                          Preferred Language        English

 

                          Marital Status            

 

                          [employer] Affiliation     1073

 

                          Race                      White

 

                          Ethnic Group              Not  or 





Author





                          Author                    [facility]

 

                          Organization              [facility]

 

                          Address                   Unknown

 

                          Phone                     Unavailable







Support





                Name            Relationship    Address         Phone

 

                Ashlyn MCKEON            Unknown         [phone]







Care Team Providers





                    Care Team Member Name Role                Phone

 

                    Tawil, Elias A MD   Unavailable         [phone]

 

                    Jocy Loomis MD Unavailable         [phone]

 

                    Husam Dailey MD    Unavailable         [phone]

 

                    Self, Referral      PCP                 Unavailable

 

                    Jocy Loomis MD Unavailable         [phone]







Reason for Referral

* Radiology Services (Routine)



                          Referred By Contact       Referred To Contact



                 Status          Reason          Specialty       Diagnoses /  



                                         Procedures  

 

                                        



Jocy Loomis MD



[address] [place] [address]



Phone: [phone]



Fax: [phone]                       







                           New Request               Diagnoses  



                                         Prostate cancer  



                                         (HCC)

  



                                         P  



                                         rocedures  



                                         US DOPPLER VENOUS  



                                         LEFT  









Electronically signed by Jocy Loomis MD at 





Encounter Details





                          Care Team                 Description



                     Date                Type                Department  

 

                                        



Jocy Loomis MD



[address] [place] [address]



[phone] (Fax)                      Prostate cancer (HCC) (Primary Dx)



                     2021          Orders Only         Oncology: [facility] [address]  



                                         [place] [address]  



                                         [phone]  







Social History





                                        Date



                 Tobacco Use     Types           Packs/Day       Years Used 

 

                                         



                 Current Every Day Smoker  Cigarettes      1.5             30 

 

    



                     Smokeless Tobacco: Former  Snuff               Quit: 



                                         User   







                    Drinks/Week         oz/Week             Comments



                                         Alcohol Use   

 

                                        



6 Cans of beer            6.0                        



                                         Yes   







 



                           Sex Assigned at Birth     Date Recorded

 

 



                                         Not on file 



documented as of this encounter



Functional Status





                                        Date of Assessment



                           Functional Status         Response 

 

                                        2020



                           Does the patient have a hearing impairment:  No 

 

                                        2020



                           Does the patient have a visual impairment:  No 

 

                                        2020



                           Does the patient have impaired ambulation:  Yes 

 

                                        2020



                           Does the patient have an activity of daily living  No

 



                                         (ADL) impairment:  

 

                                        2020



                           Does the patient have an instrumental activity of  No

 



                                         daily living (IADL) impairment:  







                                        Date of Assessment



                           Cognitive Status          Response 

 

                                        2020



                           Does the patient have a cognitive impairment:  No 



documented as of this encounter



Plan of Treatment





                          Care Team                 Description



                     Date                Type                Specialty  

 

                                        



Jocy Loomis MD



[address] [place] [address] [phone] (Fax)                       



                     2021          Hospital            Oncology  



                                         Encounter   



documented as of this encounter



Results

* US DOPPLER VENOUS LEFT (2021)







                                         Specimen

 









 



                           Narrative                 Performed At

 

 



                                         This result has an attachment that is n

ot available. 







   



                 Performing Organization  Address         City/State/ZIP Code  P

titus Number

 

   



                                         [facility]   





documented in this encounter



Visit Diagnoses









                                         Diagnosis

 





                                         Prostate cancer (HCC) - Primary



                                         Malignant neoplasm of prostate



documented in this encounter



Additional Health Concerns





 



                           Assessment                Noted Time

 

 



                           A fall risk assessment has been completed for the pat

ient  2021  2:34 PM 

CDT

 

 



                           PHQ-2 Depression Total Score: 2  2020 10:40 AM 

CST



documented as of this encounter

## 2021-08-09 NOTE — XMS REPORT
Encounter Summary

                             Created on: 2021



Rock NOLAN Chavez

External Reference #: [id number]

: 1955

Sex: Male



Demographics





                          Address                   [address]

[place]  [address]

 

                          Home Phone                [phone]

 

                          Preferred Language        English

 

                          Marital Status            

 

                          Faith Affiliation     [id number]

 

                          Race                      White

 

                          Ethnic Group              Not  or 





Author





                          Author                    [facility]

 

                          Organization              [facility]

 

                          Address                   Unknown

 

                          Phone                     Unavailable







Support





                Name            Relationship    Address         Phone

 

                Ashlyn MCKEON            Unknown         [phone]







Care Team Providers





                    Care Team Member Name Role                Phone

 

                    Tawil, Elias A MD   Unavailable         [phone]

 

                    Jocy Loomis MD Unavailable         [phone]

 

                    Husam Dailey MD    Unavailable         [phone]

 

                    Self, Referral      PCP                 Unavailable

 

                    Jocy Loomis MD Unavailable         [phone]







Encounter Details





                          Care Team                 Description



                     Date                Type                Department  

 

                                        



Chanell Olvera MA                     Malignant neoplasm of right kidney (HCC)

; 

Prostate cancer (HCC)



                     2021          Orders Only         Oncology: [facility]  



                                         [place]  



                                         [address]  



                                         [place] [address]  



                                         [phone]  







Social History





                                        Date



                 Tobacco Use     Types           Packs/Day       Years Used 

 

                                         



                 Current Every Day Smoker  Cigarettes      1.5             30 

 

    



                     Smokeless Tobacco: Former  Snuff               Quit: 1985



                                         User   







                    Drinks/Week         oz/Week             Comments



                                         Alcohol Use   

 

                                        



6 Cans of beer            6.0                        



                                         Yes   







 



                           Sex Assigned at Birth     Date Recorded

 

 



                                         Not on file 







                                        Date Recorded



                           COVID-19 Exposure         Response 

 

                                        2021  8:46 AM CDT



                           In the last month, have you been in contact with  No 

/ Unsure 



                                         someone who was confirmed or suspected 

to have  



                                         Coronavirus / COVID-19?  



documented as of this encounter



Functional Status





                                        Date of Assessment



                           Functional Status         Response 

 

                                        2020



                           Does the patient have a hearing impairment:  No 

 

                                        2020



                           Does the patient have a visual impairment:  No 

 

                                        2020



                           Does the patient have impaired ambulation:  Yes 

 

                                        2020



                           Does the patient have an activity of daily living  No

 



                                         (ADL) impairment:  

 

                                        2020



                           Does the patient have an instrumental activity of  No

 



                                         daily living (IADL) impairment:  







                                        Date of Assessment



                           Cognitive Status          Response 

 

                                        2020



                           Does the patient have a cognitive impairment:  No 



documented as of this encounter



Plan of Treatment





                          Care Team                 Description



                     Date                Type                Specialty  

 

                                        



Jocy Loomis MD



[address]



[place] [address] [phone] (Fax)                       



                     2021          Hospital            Oncology  



                                         Encounter   



documented as of this encounter



Procedures





                                        Comments



                 Procedure Name  Priority        Date/Time       Associated Diag

nosis 

 

                                         



                 CBC AND DIFF    Routine         2021      Malignant neopl

asm of 



                                         right kidney (HCC) 



                                         Prostate cancer (HCC) 

 

                                         



                 COMPREHENSIVE METABOLIC  Routine         2021      Malign

ant neoplasm of 



                           PANEL                     right kidney (HCC) 



                                         Prostate cancer (HCC) 



documented in this encounter



Results

* CBC AND DIFF (2021)



    



              Component    Value        Ref Range    Performed At  Pathologist



                                         Signature

 

    



                           White Blood               [facility] 



                                         Cells    

 

    



                           RBC                       [facility] 

 

    



                           Hemoglobin                [facility] 

 

    



                           Hematocrit                [facility] 

 

    



                           MCV                       [facility] 

 

    



                           MCH                       [facility] 

 

    



                           MCHC                      [facility] 

 

    



                           Platelet Count            [facility] 

 

    



                           MPV                       [facility] 

 

    



                           RDW                       [facility] 

 

    



                           Neutrophils               [facility] 

 

    



                           Absolute                  [facility] 



                                         Neutrophil    



                                         Count    

 

    



                           Lymphocytes               [facility] 

 

    



                           Absolute Lymph            [facility] 



                                         Count    

 

    



                           Monocytes                 [facility] 

 

    



                           Absolute                  [facility] 



                                         Monocyte Count    

 

    



                           Eosinophil                [facility] 

 

    



                           Absolute                  [facility] 



                                         Eosinophil    



                                         Count    

 

    



                           Basophils                 [facility] 

 

    



                           Absolute                  [facility] 



                                         Basophil Count    

 

    



                           Atypical Lym              [facility] 

 

    



                           Metamyelocyte             [facility] 

 

    



                           Myelocyte                 [facility] 

 

    



                           Promyelocyte              [facility] 

 

    



                           Blast                     [facility] 

 

    



                           RBC Morph                 [facility] 

 

    



                           WBC Morphology            [facility] 











                                         Specimen

 





                                         Blood - Blood







 



                           Narrative                 Performed At

 

 



                                         This result has an attachment that is n

ot available. 







   



                 Performing Organization  Address         City/State/ZIP Code  P

titus Number

 

   



                     [facility]            [address] [place] [address] 





* COMPREHENSIVE METABOLIC PANEL (2021)



    



              Component    Value        Ref Range    Performed At  Pathologist



                                         Signature

 

    



                           Sodium                    [facility] 

 

    



                           Potassium                 [facility] 

 

    



                           Chloride                  [facility] 

 

    



                           CO2                       [facility] 

 

    



                           Blood Urea                [facility] 



                                         Nitrogen    

 

    



                           Creatinine                [facility] 

 

    



                           Glucose                   [facility] 

 

    



                           Calcium                   [facility] 

 

    



                           Total Protein             [facility] 

 

    



                           Total Bilirubin           [facility] 

 

    



                           Albumin                   [facility] 

 

    



                           Alk Phosphatase           [facility] 

 

    



                           AST (SGOT)                [facility] 

 

    



                           ALT (SGPT)                [facility] 

 

    



                           eGFR Non                  [facility] 



                                             



                                         [facility]    

 

    



                           eGFR               [facility] 



                                         [facility]    

 

    



                           Anion Gap                 [facility] 











                                         Specimen

 





                                         Blood - Blood







 



                           Narrative                 Performed At

 

 



                                         This result has an attachment that is n

ot available. 







   



                 Performing Organization  Address         City/State/ZIP Code  P

titus Number

 

   



                     [facility]            [address] [id number] 





documented in this encounter



Visit Diagnoses









                                         Diagnosis

 





                                         Malignant neoplasm of right kidney (HCC

)

 





                                         Prostate cancer (HCC)



                                         Malignant neoplasm of prostate



documented in this encounter



Additional Health Concerns





 



                           Assessment                Noted Time

 

 



                           A fall risk assessment has been completed for the pat

ient  2021  2:34 PM 

CDT

 

 



                           PHQ-2 Depression Total Score: 2  2020 10:40 AM 

CST



documented as of this encounter

## 2021-08-09 NOTE — XMS REPORT
Encounter Summary

                             Created on: 2021



Rock NOLAN Chavez

External Reference #: [id number]

: 1955

Sex: Male



Demographics





                          Address                   [address]  [address]

 

                          Home Phone                [phone]

 

                          Preferred Language        English

 

                          Marital Status            

 

                          Gnosticist Affiliation     [id number]

 

                          Race                      White

 

                          Ethnic Group              Not  or 





Author





                          Author                    [facility]

 

                          Organization              [facility]

 

                          Address                   Unknown

 

                          Phone                     Unavailable







Support





                Name            Relationship    Address         Phone

 

                Ashlyn MCKEON            Unknown         [phone]







Care Team Providers





                    Care Team Member Name Role                Phone

 

                    Tawil, Elias A MD   Unavailable         [phone]

 

                    Jocy Loomis MD Unavailable         [phone]

 

                    Husam Dailey MD    Unavailable         [phone]

 

                    Self, Referral      PCP                 Unavailable

 

                    Jocy Loomis MD Unavailable         [phone]







Reason for Referral

* Radiology Services (Routine)



                          Referred By Contact       Referred To Contact



                 Status          Reason          Specialty       Diagnoses /  



                                         Procedures  

 

                                        



Krystina Hidalgo PA-C



[address] [place] [address]



Phone: [phone]



Fax: [phone]                       







                           New Request               Diagnoses  



                                         Malignant neoplasm  



                                         of right kidney  



                                         (HCC)  



                                         Prostate cancer  



                                         (HCC)

  



                                         P  



                                         rocedures  



                                         CT ABD/PELV W  



                                         CONTRAST  









Electronically signed by Krystina Hidalgo PA-C at 

* Radiology Services (Routine)



                          Referred By Contact       Referred To Contact



                 Status          Reason          Specialty       Diagnoses /  



                                         Procedures  

 

                                        



Krystina Hidalgo PA-C



[address] [place] [address]



Phone: [phone]



Fax: [phone]                       







                           New Request               Diagnoses  



                                         Malignant neoplasm  



                                         of right kidney  



                                         (HCC)  



                                         Prostate cancer  



                                         (HCC)

  



                                         P  



                                         rocedures  



                                         CT CHEST W  



                                         CONTRAST  









Electronically signed by Krystina Hidalgo PA-C at 

* Radiology Services (Routine)



                          Referred By Contact       Referred To Contact



                 Status          Reason          Specialty       Diagnoses /  



                                         Procedures  

 

                                        



Krystina Hidalgo PA-C



[address] [place] [address]



Phone: [phone]



Fax: [phone]                       







                           New Request               Diagnoses  



                                         Malignant neoplasm  



                                         of right kidney  



                                         (HCC)  



                                         Prostate cancer  



                                         (HCC)

  



                                         P  



                                         rocedures  



                                         MRI HEAD WO/W  



                                         CONTRAST  









Electronically signed by Krystina Hidalgo PA-C at 





Reason for Visit

* 



 



                           Reason                    Comments

 

 



                                         Follow Up 









Encounter Details





                          Care Team                 Description



                     Date                Type                Department  

 

                                        



Krystina Hidalgo PA-C



[address] [place] [address]



[phone] (Fax)                      Malignant neoplasm of right kidney (HCC)

 (Primary Dx); 

Prostate cancer (HCC); 

Hypothyroidism due to medication



                     2021          Office Visit        Oncology: [facility] [address]  



                                         [place] [address]  



                                         [phone]  







Social History





                                        Date



                 Tobacco Use     Types           Packs/Day       Years Used 

 

                                         



                 Current Every Day Smoker  Cigarettes      1.5             30 

 

    



                     Smokeless Tobacco: Former  Snuff               Quit: 



                                         User   







                    Drinks/Week         oz/Week             Comments



                                         Alcohol Use   

 

                                        



6 Cans of beer            6.0                        



                                         Yes   







 



                           Sex Assigned at Birth     Date Recorded

 

 



                                         Not on file 







                                        Date Recorded



                           COVID-19 Exposure         Response 

 

                                        2021  8:46 AM CDT



                           In the last month, have you been in contact with  No 

/ Unsure 



                                         someone who was confirmed or suspected 

to have  



                                         Coronavirus / COVID-19?  



documented as of this encounter



Functional Status





                                        Date of Assessment



                           Functional Status         Response 

 

                                        2020



                           Does the patient have a hearing impairment:  No 

 

                                        2020



                           Does the patient have a visual impairment:  No 

 

                                        2020



                           Does the patient have impaired ambulation:  Yes 

 

                                        2020



                           Does the patient have an activity of daily living  No

 



                                         (ADL) impairment:  

 

                                        2020



                           Does the patient have an instrumental activity of  No

 



                                         daily living (IADL) impairment:  







                                        Date of Assessment



                           Cognitive Status          Response 

 

                                        2020



                           Does the patient have a cognitive impairment:  No 



documented as of this encounter



Progress Notes

* Krystina Hidalgo PA-C - 2021  2:30 PM CDT



Formatting of this note is different from the original.

Name: Rock NOLAN Chavez          MRN: [id number]      : 1955      AGE: 66 y.o.


DATE OF SERVICE: 2021



Obtained patient's verbal consent to treat them and their agreement to [employer] fin
ancial policy and NPP via this telehealth visit during the Coronavirus Public [facility]
alth Emergency



Subjective:        

 

Reason for Visit:  Follow Up



Rock NOLAN Chavez is a 66 y.o. male. 



Cancer Staging

Malignant neoplasm of right kidney (HCC)

Staging form: Kidney, AJCC 8th Edition

- Clinical stage from 2020: Stage IV (cT1b, pM1) - Signed by Yeni de la rosa, Jocy COREY MD on 2020



Prostate cancer (HCC)

Staging form: Prostate, AJCC 8th Edition

- Clinical: No stage assigned - Unsigned



Onc Timeline 

Prostate cancer (HCC) 

2020 Initial Diagnosis 

 Prostate cancer (HCC)

 

2020 Biopsy 

 TRUS biopsy, revealing 4 cores (bilateral) positive for grade group 5 prostate 
adenocarcinoma, comprising 5-60% of core tissue

 

2020 Supportive Care 

 OP SUPPORT LEUPROLIDE DEPOT (EVERY 3 MONTHS)

Plan Provider: Jocy Loomis MD

Treatment goal: Control

 

Malignant neoplasm of right kidney (HCC) 

2020 Initial Diagnosis 

 Malignant neoplasm of right kidney (HCC)

 

2020 Biopsy 

 Bone biopsy -- consistent with metastatic clear cell renal cell carcinoma 

 

2020 Cancer Staged 

 Staging form: Kidney, AJCC 8th Edition

- Clinical stage from 2020: Stage IV (cT1b, pM1)

 

2020 - 2021 Chemotherapy 

 OP  NIVOLUMAB + IPILIMUMAB FOLLOWED BY NIVOLUMAB (EVERY 4 WEEKS)

Plan Provider: Jocy Loomis MD

Treatment goal: Palliative 

 

2020 - 2020 Radiation 

 Palliative RT to R iliac bone metastasis over 13 fractions

 

2021 -  Chemotherapy 

 OP  CABOZANTINIB (TABLETS)

Plan Provider: Jocy Loomis MD

Treatment goal: Palliative 

 

2021 -  Chemotherapy 

 OP SUPPORT ZOLEDRONIC ACID (MONTHLY)

Plan Provider: Jocy Loomis MD

Treatment goal: Supportive Care Plan

 



History of Present Illness

Mr. Rock Chavez is a pleasant 66-year-old gentleman who presents today for rusty
oing evaluation of his metastatic renal cell carcinoma for which he is currently
on cabozantinib at 40 mg daily.   He also initiated treatment with zoledronic a
ellie at the time of his appointment 1 month ago.



The patient relates that shortly after his zoledronic acid infusion he noticed i
ncreased fatigue.  The evening following his infusion, he was also up in his kit
sherman in the middle night and was stumbling around  "almost sleepwalking."  He
relates that the next morning his brother noticed that he was "out of it" and it
took 2-3 days for his mentation to clear.  The patient relates that he has not 
been able to work for the past month because he has been so fatigued.  He states
that he can only do 15-20 minutes activity before he is ready to "collapse."  He
states that he is frequently dizzy and has an occasional "light headache."  He 
relates occasional nausea but has not vomited.  He denies any visual changes.



He did recently see his primary care provider who diagnosed him with an ear infe
ction.  He was subsequently started on a course of amoxicillin and Flonase.  He 
relates that his sore throat has improved since he started the antibiotics and h
jie is now able to drink more water.  He still has 4 days remaining of the antibio
tics.



The patient denies any sores on the palms of his hands or soles of his feet.  He
does note some "rough spots" on the tops of his hands and his elbows.  He is us
ing lotion as needed.



The patient relates that he has not been able to work for the past month.  He st
ates that he does not want to take zoledronic acid again and inquires about the 
use of Xgeva.



He is scheduled for a thyroid biopsy tomorrow in [place].



 

Review of Systems 

Constitutional: Positive for activity change, appetite change and fatigue. Negat
js for chills, fever and unexpected weight change. 

HENT: Positive for ear pain and sore throat. Negative for dental problem, hearin
g loss, mouth sores, nosebleeds, rhinorrhea, sinus pain, sneezing, tinnitus, tro
uble swallowing and voice change.  

Eyes: Negative for visual disturbance. 

Respiratory: Negative for cough, chest tightness, shortness of breath and wheezi
ng.  

Cardiovascular: Negative for chest pain, palpitations and leg swelling. 

Gastrointestinal: Positive for constipation and diarrhea. Negative for abdominal
pain, blood in stool, nausea and vomiting. 

Genitourinary: Negative for decreased urine volume, difficulty urinating, dysuri
a, flank pain, frequency, hematuria and urgency. 

Musculoskeletal: Positive for arthralgias and back pain. Negative for gait probl
em and myalgias. 

Skin: Negative for rash and wound. 

Neurological: Positive for dizziness, weakness, light-headedness and headaches. 
Negative for syncope and numbness. 

Psychiatric/Behavioral: Negative for confusion, dysphoric mood and sleep disturb
ance. The patient is not nervous/anxious.  



Objective:       

 aspirin EC (ASPIR-81) 81 mg tablet Take 81 mg by mouth every 24 hours. 

 atorvastatin (LIPITOR) 10 mg tablet Take one tablet by mouth daily. 

 cabozantinib (CABOMETYX) 40 mg tablet Take one tablet by mouth daily. Take o
n an empty stomach, at least 1 hour before or 2 hours after food. 

 Calcium-Cholecalciferol (D3) (CALCIUM 500+D) 500 mg(1,250mg) -400 unit chew 
Chew 1 tablet by mouth twice daily. 

 clobetasoL (TEMOVATE) 0.05 % topical cream Please apply a small amount to th
e palms of hands or soles of feet twice per day if you have soreness in spite of
OTC lotion 

 hydroCHLOROthiazide (HYDRODIURIL) 25 mg tablet Take one tablet by mouth ever
y morning. 

 HYDROcodone/acetaminophen (NORCO) 5/325 mg tablet Take two tablets by mouth 
every 4 hours as needed for Pain  May cause constipation; if causes constipation
, can take Senna as a laxative 

 lisinopriL (ZESTRIL) 40 mg tablet Take one tablet by mouth daily. 

 morphine SR (MS CONTIN) 30 mg ER tablet Take one tablet by mouth every 12 ho
urs 

 omeprazole DR (PRILOSEC) 40 mg capsule Take one capsule by mouth daily befor
e breakfast. 

 prochlorperazine (COMPAZINE) 5 mg tablet Take one tablet by mouth every 6 ho
urs as needed for Nausea. 

 sertraline (ZOLOFT) 50 mg tablet Take one tablet by mouth daily. 



Vitals: 

 21 1434 

PainSc: Zero 



There is no height or weight on file to calculate BMI. 



Pain Score: Zero

 



Fatigue Scale: 6



Pain Addressed:  Patient to call office if pain not relieved or worsened and Cur
rent regimen working to control pain.



Patient Evaluated for a Clinical Trial: Patient not eligible for a treatment tri
al (including not needing treatment, needs palliative care, in remission). 



[employer] performance status is 1, Restricted in physic
ally strenuous activity but ambulatory and able to carry out work of a light or 
sedentary nature, e.g., light house work, office work.



 Physical Exam

Constitutional:  

   General: He is not in acute distress.

   Appearance: Normal appearance. He is not diaphoretic. 

HENT: 

   Head: Normocephalic and atraumatic. 

Eyes: 

   Conjunctiva/sclera: Conjunctivae normal. 

Pulmonary: 

   Effort: Pulmonary effort is normal. 

Neurological: 

   Mental Status: He is alert and oriented to person, place, and time. 

Psychiatric:    

   Mood and Affect: Mood normal.    

   Behavior: Behavior normal.    

   Thought Content: Thought content normal.    

   Judgment: Judgment normal. 



 

Outside labs from [facility] from 2021 we
re reviewed and are as noted in the EMR.  It should be noted his WBC was 4.3, he
moglobin 14.8, hematocrit 44, platelet count 197, sodium 139, potassium 4.3, BUN
12, creatinine 0.84, glucose 84, calcium 9.2, phosphorus 3.3, magnesium 2.2, AST
22, ALTs 24, alkaline phosphatase 82

 

Assessment and Plan:



Mr. Rock Chavez is a 66-year-old gentleman with a past medical history signifi
cant for CAD, hypertension and hyperlipidemia who presents today for ongoing aretha
luation of his prostate cancer as well as his metastatic renal cell carcinoma.



1.  Metastatic renal cell carcinoma.

-Patient presents today for a lab and symptom check while on treatment with cabo
zantinib at 40 mg daily.

-Reviewed his outside CBC and CMP which are appropriate for continuing with kenisha
tment.  

-However given his severe fatigue, he will hold his cabozantinib for 4 days and 
update us later this week regarding his symptoms.

-Plan for him to return to clinic in 1 month with repeat labs as well as updated
CT scans of the chest/abdomen/pelvis which will be done locally.



2.  Grade group 5 prostate cancer

-Continue leuprolide therapy indefinitely.  

-He will be due for his next leuprolide injection in 2021



3.  Osseous metastases.

-Initiated zoledronic acid 1 month ago.  Subsequently he has experienced increas
ed fatigue, nausea, headaches, dizziness.  Patient was seen in conjunction with 
our pharmacist Faye Montano, Pharm.D. 

-We discussed that these are uncommon side effects from zoledronic acid and the 
side effectstypically are more short-lived.  However we will discontinue his zol
edronic acid and consider initiating denosumab pending resolution of his current
symptoms.



4.  Cancer-related pain.

-Continue morphine SR 30 mg every 12 as well as hydrocodone/APAP 10/325 every 4 
hours as needed



5.  Palmar plantar erythrodysesthesia.

-Currently managed with the use of urea as well as clobetasol cream twice daily



6.  GERD.

-Continue omeprazole to 40 mg daily and use Tums as needed



7.  Thyroid nodules.

-Has biopsy of thyroid tomorrow - locally



8.  Ear infection.

-Continue with course of amoxicillin and flonase as prescribed by PCP



9.  Increased fatigue/headaches/dizziness.

-Check TSH with free T4 upon return.

-Hold cabozatinib and update us later this week regarding fatigue.

-MRI brain at earliest convenience locally.



Krystina Hidalgo PA-C



Supervising physician:  Jocy Loomis MD

 



 



 





Electronically signed by Krystina Hidalgo PA-C at 2021  3:57 PM CDT

documented in this encounter



Plan of Treatment





                          Care Team                 Description



                     Date                Type                Specialty  

 

                                        



Jocy Loomis MD



[address] [facility]



Cancer Center [place], [place] [address] [phone] (Fax)                       



                     2021          Hospital            Oncology  



                                         Encounter   







                                        Order Schedule



                 Name            Type            Priority        Associated Diag

noses 

 

                                        Expected: 2021 (Approximate), Expi

res: 2022



                 TSH WITH FREE T4 REFLEX  Lab             Routine         Hypoth

yroidism due to 



                                         medication 



documented as of this encounter



Results

* CT ABD/PELV W CONTRAST (2021)



 



                           Narrative                 Performed At

 

 



                                         This result has an attachment that is n

ot available. 







   



                 Performing Organization  Address         City/State/ZIP Code  P

titus Number

 

   



                                         [facility]   





* CT CHEST W CONTRAST (2021)



 



                           Narrative                 Performed At

 

 



                                         This result has an attachment that is n

ot available. 







   



                 Performing Organization  Address         City/State/ZIP Code  P

titus Number

 

   



                                         [facility]   





* MRI HEAD WO/W CONTRAST (2021)



 



                           Narrative                 Performed At

 

 



                                         This result has an attachment that is n

ot available. 







   



                 Performing Organization  Address         City/State/ZIP Code  P

titus Number

 

   



                                         [facility]   





* COMPREHENSIVE METABOLIC PANEL (2021)



    



              Component    Value        Ref Range    Performed At  Pathologist



                                         Signature

 

    



                           Sodium                    [facility] 

 

    



                           Potassium                 [facility] 

 

    



                           Chloride                  [facility] 

 

    



                           CO2                       [facility] 

 

    



                           Blood Urea                [facility] 



                                         Nitrogen    

 

    



                           Creatinine                [facility] 

 

    



                           Glucose                   [facility] 

 

    



                           Calcium                   [facility] 

 

    



                           Total Protein             [facility] 

 

    



                           Total Bilirubin           [facility] 

 

    



                           Albumin                   [facility] 

 

    



                           Alk Phosphatase           [facility] 

 

    



                           AST (SGOT)                [facility] 

 

    



                           ALT (SGPT)                [facility] 

 

    



                           eGFR Non                  [facility] 



                                             



                                         American    

 

    



                           eGFR               [facility] 



                                         American    

 

    



                           Anion Gap                 [facility] 











                                         Specimen

 





                                         Blood - Blood







 



                           Narrative                 Performed At

 

 



                                         This result has an attachment that is n

ot available. 







   



                 Performing Organization  Address         City/State/ZIP Code  P

titus Number

 

   



                     [facility]            [address] [place] [address] 





* CBC AND DIFF (2021)



    



              Component    Value        Ref Range    Performed At  Pathologist



                                         Signature

 

    



                           White Blood               [facility] 



                                         Cells    

 

    



                           RBC                       [facility] 

 

    



                           Hemoglobin                [facility] 

 

    



                           Hematocrit                [facility] 

 

    



                           MCV                       [facility] 

 

    



                           MCH                       [facility] 

 

    



                           MCHC                      [facility] 

 

    



                           Platelet Count            [facility] 

 

    



                           MPV                       [facility] 

 

    



                           RDW                       [facility] 

 

    



                           Neutrophils               [facility] 

 

    



                           Absolute                  [facility] 



                                         Neutrophil    



                                         Count    

 

    



                           Lymphocytes               [facility] 

 

    



                           Absolute Lymph            [facility] 



                                         Count    

 

    



                           Monocytes                 [facility] 

 

    



                           Absolute                  [facility] 



                                         Monocyte Count    

 

    



                           Eosinophil                [facility] 

 

    



                           Absolute                  [facility] 



                                         Eosinophil    



                                         Count    

 

    



                           Basophils                 [facility] 

 

    



                           Absolute                  [facility] 



                                         Basophil Count    

 

    



                           Atypical Lym              [facility] 

 

    



                           Metamyelocyte             [facility] 

 

    



                           Myelocyte                 [facility] 

 

    



                           Promyelocyte              [facility] 

 

    



                           Blast                     [facility] 

 

    



                           RBC Morph                 [facility] 

 

    



                           WBC Morphology            [facility] 











                                         Specimen

 





                                         Blood - Blood







 



                           Narrative                 Performed At

 

 



                                         This result has an attachment that is n

ot available. 







   



                 Performing Organization  Address         City/State/ZIP Code  P

titus Number

 

   



                     [facility]            [address] [place] [address] 





documented in this encounter



Visit Diagnoses









                                         Diagnosis

 





                                         Malignant neoplasm of right kidney (HCC

) - Primary

 





                                         Prostate cancer (HCC)



                                         Malignant neoplasm of prostate

 





                                         Hypothyroidism due to medication



documented in this encounter



Additional Health Concerns





 



                           Assessment                Noted Time

 

 



                           A fall risk assessment has been completed for the pat

ient  2021  2:34 PM 

CDT

 

 



                           PHQ-2 Depression Total Score: 2  2020 10:40 AM 

CST



documented as of this encounter

## 2021-08-09 NOTE — XMS REPORT
Encounter Summary

                             Created on: 2021



Rock NOLAN Chavez

External Reference #: [id number]

: 1955

Sex: Male



Demographics





                          Address                   [address]

[place]  [address]

 

                          Home Phone                [phone]

 

                          Preferred Language        English

 

                          Marital Status            

 

                          Islam Affiliation     1073

 

                          Race                      White

 

                          Ethnic Group              Not  or 





Author





                          Author                    [facility]

 

                          Organization              [facility]

 

                          Address                   Unknown

 

                          Phone                     Unavailable







Support





                Name            Relationship    Address         Phone

 

                Ashlyn MCKEON            Unknown         [phone]







Care Team Providers





                    Care Team Member Name Role                Phone

 

                    Tawil, Elias A MD   Unavailable         [phone]

 

                    Jocy Loomis MD Unavailable         [phone]

 

                    Husam Dailey MD    Unavailable         [phone]

 

                    Self, Referral      PCP                 Unavailable

 

                    Jocy Loomis MD Unavailable         [phone]







Reason for Visit

* 



  



                     Reason              Onset Date          Comments

 

  



                           Navigation Follow Up      2021 









Encounter Details





                          Care Team                 Description



                     Date                Type                Department  

 

                                        



Are, MARY BETH Patel                          Navigation Follow Up



                     2021          Telephone           Oncology: [facility]  



                                         [address] [address]  



                                         [phone]  







Social History





                                        Date



                 Tobacco Use     Types           Packs/Day       Years Used 

 

                                         



                 Current Every Day Smoker  Cigarettes      1.5             30 

 

    



                     Smokeless Tobacco: Former  Snuff               Quit: 1985



                                         User   







                    Drinks/Week         oz/Week             Comments



                                         Alcohol Use   

 

                                        



6 Cans of beer            6.0                        



                                         Yes   







 



                           Sex Assigned at Birth     Date Recorded

 

 



                                         Not on file 







                                        Date Recorded



                           COVID-19 Exposure         Response 

 

                                        2021  8:46 AM CDT



                           In the last month, have you been in contact with  No 

/ Unsure 



                                         someone who was confirmed or suspected 

to have  



                                         Coronavirus / COVID-19?  



documented as of this encounter



Functional Status





                                        Date of Assessment



                           Functional Status         Response 

 

                                        2020



                           Does the patient have a hearing impairment:  No 

 

                                        2020



                           Does the patient have a visual impairment:  No 

 

                                        2020



                           Does the patient have impaired ambulation:  Yes 

 

                                        2020



                           Does the patient have an activity of daily living  No

 



                                         (ADL) impairment:  

 

                                        2020



                           Does the patient have an instrumental activity of  No

 



                                         daily living (IADL) impairment:  







                                        Date of Assessment



                           Cognitive Status          Response 

 

                                        2020



                           Does the patient have a cognitive impairment:  No 



documented as of this encounter



Miscellaneous Notes

* Telephone Encounter - Amanda Cuellar RN - 2021  1:14 PM CDT



Formatting of this note might be different from the original.

Pt has thyroid nodule with recommendation for thyroid biopsy.  He has been unabl
e to successfully travel to [facility] for this procedure.  Upon pt's request, a requ
est has been made to his local PCP to set up the biopsy closer to home.



Records faxed to Dr Mehdi Aragon at F [phone] and discussed with assistant i
n his office. P [phone].

Requested clouding US to [facility]. 



Pt is aware of these efforts.  



Electronically signed by Aamnda Cuellar RN at 2021  1:16 PM CDT

documented in this encounter



Plan of Treatment





                          Care Team                 Description



                     Date                Type                Specialty  

 

                                        



Jocy Loomis MD



[address] [facility]



Cancer Center [place] [id number] [phone] (Fax)                       



                     2021          Hospital            Oncology  



                                         Encounter   



documented as of this encounter



Visit Diagnoses

Not on filedocumented in this encounter



Additional Health Concerns





 



                           Assessment                Noted Time

 

 



                           A fall risk assessment has been completed for the pat

ient  2021  9:56 AM 

CDT

 

 



                           PHQ-2 Depression Total Score: 2  2020 10:40 AM 

CST



documented as of this encounter

## 2021-08-09 NOTE — XMS REPORT
Encounter Summary

                             Created on: 2021



Rock NOLAN Chavez

External Reference #: [id number]

: 1955

Sex: Male



Demographics





                          Address                   [address]

[place]  [address]

 

                          Home Phone                [phone]

 

                          Preferred Language        English

 

                          Marital Status            

 

                          Synagogue Affiliation     1073

 

                          Race                      White

 

                          Ethnic Group              Not  or 





Author





                          Author                    [facility]

 

                          Organization              [facility]

 

                          Address                   Unknown

 

                          Phone                     Unavailable







Support





                Name            Relationship    Address         Phone

 

                Ashlyn MCKEON            Unknown         [phone]







Care Team Providers





                    Care Team Member Name Role                Phone

 

                    Tawil, Elias A MD   Unavailable         [phone]

 

                    Jocy Loomis MD Unavailable         [phone]

 

                    Husam Dailey MD    Unavailable         [phone]

 

                    Self, Referral      PCP                 Unavailable

 

                    Jocy Loomis MD Unavailable         [phone]







Encounter Details





                          Care Team                 Description



                     Date                Type                Department  

 

                                        



Jocy Loomis MD



[address] [place] [address]



[phone] (Fax)                      Malignant neoplasm of right kidney (HCC)

 (Primary Dx); 

Prostate cancer (HCC); 

Hypothyroidism due to medication



                     2021          Lab Only            Oncology: [facility] [address]  



                                         [place], [place]   



                                         [phone]  







Social History





                                        Date



                 Tobacco Use     Types           Packs/Day       Years Used 

 

                                         



                 Current Every Day Smoker  Cigarettes      1.5             30 

 

    



                     Smokeless Tobacco: Former  Snuff               Quit: 



                                         User   







                    Drinks/Week         oz/Week             Comments



                                         Alcohol Use   

 

                                        



6 Cans of beer            6.0                        



                                         Yes   







 



                           Sex Assigned at Birth     Date Recorded

 

 



                                         Not on file 







                                        Date Recorded



                           COVID-19 Exposure         Response 

 

                                        2021  2:29 PM CDT



                           In the last month, have you been in contact with  No 

/ Unsure 



                                         someone who was confirmed or suspected 

to have  



                                         Coronavirus / COVID-19?  



documented as of this encounter



Functional Status





                                        Date of Assessment



                           Functional Status         Response 

 

                                        2020



                           Does the patient have a hearing impairment:  No 

 

                                        2020



                           Does the patient have a visual impairment:  No 

 

                                        2020



                           Does the patient have impaired ambulation:  Yes 

 

                                        2020



                           Does the patient have an activity of daily living  No

 



                                         (ADL) impairment:  

 

                                        2020



                           Does the patient have an instrumental activity of  No

 



                                         daily living (IADL) impairment:  







                                        Date of Assessment



                           Cognitive Status          Response 

 

                                        2020



                           Does the patient have a cognitive impairment:  No 



documented as of this encounter



Plan of Treatment





                          Care Team                 Description



                     Date                Type                Specialty  

 

                                        



Jocy Loomis MD



[address] [facility]



[place] [address]



[phone] (Fax)                       



                     2021          Hospital            Oncology  



                                         Encounter   







                                        Date/Time



                 Name            Type            Priority        Associated Diag

noses 

 

                                        2021  2:40 PM CDT



                 25-OH VITAMIN D (D2 + D3)  Lab             Routine         María

gnant neoplasm of 



                                         right kidney (HCC) 



documented as of this encounter



Procedures





                                        Comments



                 Procedure Name  Priority        Date/Time       Associated Diag

nosis 

 

                                        



Results for this procedure are in the results section.



                 PHOSPHORUS      Routine         2021      Prostate cancer

 (HCC) 



                           2:40 PM CDT               Malignant neoplasm of 



                                         right kidney (HCC) 

 

                                        



Results for this procedure are in the results section.



                 MAGNESIUM       Routine         2021      Prostate cancer

 (HCC) 



                           2:40 PM CDT               Malignant neoplasm of 



                                         right kidney (HCC) 

 

                                        



Results for this procedure are in the results section.



                 COMPREHENSIVE METABOLIC  Routine         2021      Malign

ant neoplasm of 



                     PANEL               2:40 PM CDT         right kidney (HCC) 



documented in this encounter



Results

* PHOSPHORUS (2021  2:40 PM CDT)



    



              Component    Value        Ref Range    Performed At  Pathologist



                                         Signature

 

    



                 Phosphorus      3.8             2.0 - 4.5 MG/DL  [facility] 











                                         Specimen

 





                                         Blood







   



                 Performing Organization  Address         City/[facility]/ZIP Code  P

titus Number

 

   



                     [facility]            [address] [place] [address] 





* MAGNESIUM (2021  2:40 PM CDT)



    



              Component    Value        Ref Range    Performed At  Pathologist



                                         Signature

 

    



                 Magnesium       1.8             1.6 - 2.6 mg/dL  [facility] 











                                         Specimen

 





                                         Blood







   



                 Performing Organization  Address         City/[facility]/ZIP Code  P

titus Number

 

   



                     [facility]            [address] [place] [address] 





* COMPREHENSIVE METABOLIC PANEL (2021  2:40 PM CDT)



    



              Component    Value        Ref Range    Performed At  Pathologist



                                         Signature

 

    



                 Sodium          135 (L)         137 - 147 MMOL/L  [facility] 

 

    



                 Potassium       4.2             3.5 - 5.1 MMOL/L  [facility] 

 

    



                 Chloride        101             98 - 110 MMOL/L  [facility] 

 

    



                 Glucose         147 (H)         70 - 100 MG/DL  [facility] 

 

    



                 Blood Urea      17              7 - 25 MG/DL    [facility] 



                                         Nitrogen    

 

    



                 Creatinine      0.91            0.4 - 1.24 MG/DL  [facility] 

 

    



                 Calcium         9.6             8.5 - 10.6 MG/DL  [facility] 

 

    



                 Total Protein   6.7             6.0 - 8.0 G/DL  [facility] 

 

    



                 Total Bilirubin  0.6             0.3 - 1.2 MG/DL  [facility] 

 

    



                 Albumin         3.8             3.5 - 5.0 G/DL  [facility] 

 

    



                 Alk Phosphatase  70              25 - 110 U/L    [facility] 

 

    



                 AST (SGOT)      21              7 - 40 U/L      [facility] 

 

    



                 CO2             28              21 - 30 MMOL/L  [facility] 

 

    



                 ALT (SGPT)      19              7 - 56 U/L      [facility] 

 

    



                 Anion Gap       6               3 - 12          [facility] 

 

    



                 eGFR Non        >60             >60 mL/min      [facility] 



                                              Comment:   



                           American                  The eGFR is not validated f

or   



                                         use in drug dosing   



                                         adjustments. Continue to use   



                                         estimated creatinine   



                                         clearance per dosing reference   



                                         text. Please contact the   



                                         Clinical Pharmacist for   



                                         questions.   

 

    



                 eGFR     >60             >60 mL/min      [facility] 



                           [employer]                  Comment:   



                                         The eGFR is not validated for   



                                         use in drug dosing   



                                         adjustments. Continue to use   



                                         estimated creatinine   



                                         clearance per dosing reference   



                                         text. Please contact the   



                                         Clinical Pharmacist for   



                                         questions.   











                                         Specimen

 





                                         Blood







   



                 Performing Organization  Address         City/State/ZIP Code  P

titus Number

 

   



                     [facility]            [address] [id number] 





documented in this encounter



Visit Diagnoses









                                         Diagnosis

 





                                         Malignant neoplasm of right kidney (HCC

) - Primary

 





                                         Prostate cancer (HCC)



                                         Malignant neoplasm of prostate

 





                                         Hypothyroidism due to medication



documented in this encounter



Orders





                                        First Ordered Date



                     Lab Orders Without Results  Count               Last Ordere

d Date 

 

                                         



                     TSH WITH FREE T4 REFLEX  1                   2021 



documented in this encounter



Additional Health Concerns





 



                           Assessment                Noted Time

 

 



                           PHQ-2 Depression Total Score: 2  2020 10:40 AM 

CST



documented as of this encounter

## 2021-08-09 NOTE — XMS REPORT
Encounter Summary

                             Created on: 2021



Rock NOLAN Chavez

External Reference #: [id number]

: 1955

Sex: Male



Demographics





                          Address                   [address]  [address]

 

                          Home Phone                [phone]

 

                          Preferred Language        English

 

                          Marital Status            

 

                          Faith Affiliation     [id number]

 

                          Race                      White

 

                          Ethnic Group              Not  or 





Author





                          Author                    [facility]

 

                          Organization              [facility]

 

                          Address                   Unknown

 

                          Phone                     Unavailable







Support





                Name            Relationship    Address         Phone

 

                Ashlyn MCKEON            Unknown         [phone]







Care Team Providers





                    Care Team Member Name Role                Phone

 

                    Tawil, Elias A MD   Unavailable         [phone]

 

                    Jocy Loomis MD Unavailable         [phone]

 

                    Husam Dailey MD    Unavailable         [phone]

 

                    Self, Referral      PCP                 Unavailable

 

                    Jocy Loomis MD Unavailable         [phone]







Reason for Visit

* 



 



                           Reason                    Comments

 

 



                                         Medication Refill 









Encounter Details





                          Care Team                 Description



                     Date                Type                Department  

 

                                        



Jocy Loomis MD



[address] [facility]



[place] [address]



[phone] (Fax)                      Malignant neoplasm of right kidney (HCC)



                     2021          Refill              Oncology: [facility]  



                                         [address]  



                                         [place], [place]   



                                         [phone]  







Social History





                                        Date



                 Tobacco Use     Types           Packs/Day       Years Used 

 

                                         



                 Current Every Day Smoker  Cigarettes      1.5             30 

 

    



                     Smokeless Tobacco: Former  Snuff               Quit: 



                                         User   







                    Drinks/Week         oz/Week             Comments



                                         Alcohol Use   

 

                                        



6 Cans of beer            6.0                        



                                         Yes   







 



                           Sex Assigned at Birth     Date Recorded

 

 



                                         Not on file 



documented as of this encounter



Functional Status





                                        Date of Assessment



                           Functional Status         Response 

 

                                        2020



                           Does the patient have a hearing impairment:  No 

 

                                        2020



                           Does the patient have a visual impairment:  No 

 

                                        2020



                           Does the patient have impaired ambulation:  Yes 

 

                                        2020



                           Does the patient have an activity of daily living  No

 



                                         (ADL) impairment:  

 

                                        2020



                           Does the patient have an instrumental activity of  No

 



                                         daily living (IADL) impairment:  







                                        Date of Assessment



                           Cognitive Status          Response 

 

                                        2020



                           Does the patient have a cognitive impairment:  No 



documented as of this encounter



Plan of Treatment





                          Care Team                 Description



                     Date                Type                Specialty  

 

                                        



Jocy Loomis MD



[address] [place], [place] [address] [phone] (Fax)                       



                     2021          Hospital            Oncology  



                                         Encounter   



documented as of this encounter



Visit Diagnoses









                                         Diagnosis

 





                                         Malignant neoplasm of right kidney (HCC

)



documented in this encounter



Additional Health Concerns





 



                           Assessment                Noted Time

 

 



                           A fall risk assessment has been completed for the pat

ient  2021  2:34 PM 

CDT

 

 



                           PHQ-2 Depression Total Score: 2  2020 10:40 AM 

CST



documented as of this encounter

## 2021-08-09 NOTE — XMS REPORT
Encounter Summary

                             Created on: 2021



Rock NOLAN Chavez

External Reference #: [id number]

: 1955

Sex: Male



Demographics





                          Address                   [address]  [address]

 

                          Home Phone                [phone]

 

                          Preferred Language        English

 

                          Marital Status            

 

                          Islam Affiliation     1073

 

                          Race                      White

 

                          Ethnic Group              Not  or 





Author





                          Author                    [facility]

 

                          Organization              [facility]

 

                          Address                   Unknown

 

                          Phone                     Unavailable







Support





                Name            Relationship    Address         Phone

 

                Ashlyn MCKEON            Unknown         [phone]







Care Team Providers





                    Care Team Member Name Role                Phone

 

                    Tawil, Elias A MD   Unavailable         [phone]

 

                    Jocy Loomis MD Unavailable         [phone]

 

                    Husam Dailey MD    Unavailable         [phone]

 

                    Self, Referral      PCP                 Unavailable

 

                    Jocy Loomis MD Unavailable         [phone]







Reason for Visit

* Treatment (Routine)



                          Referred By Contact       Referred To Contact



                 Status          Reason          Specialty       Diagnoses /  



                                         Procedures  

 

                                        



Jocy Loomis MD



[address] [place] [address]



Phone: [phone]



Fax: [phone]                       



[facility] [facility] [address]



[address]



[place] [id number]



Phone: [phone]



Fax: [phone]



                           Closed                    Diagnoses  



                                         Malignant neoplasm  



                                         of right kidney  



                                         (HCC)  



                                         Malignant neoplasm  



                                         of prostate (HCC)  



                                         Secondary  



                                         malignant neoplasm  



                                         of bone (HCC)  



                                         Secondary and  



                                         unspecified  



                                         malignant neoplasm  



                                         of lymph node,  



                                         unspecified (HCC)  



                                         Long term  



                                         (current) use of  



                                         other agents  



                                         affecting estrogen  



                                         receptors and  



                                         estrogen levels  



                                         C64.1, C61,  



                                         C79.51, C77.9,  



                                         Z79.818

  



                                         P  



                                         rocedures  



                                         zoledronic acid  



                                         (ZOMETA)  











Encounter Details





                          Care Team                 Description



                     Date                Type                Department  

 

                                        



Jocy Loomis MD



[address] [place] [address]



[phone] (Fax)                       



                     2021          Hospital            Oncology: [place] 

 



                           Encounter                 [place]  



                                         [facility]  



                                         [address] [facility]  



                                         [address]  



                                         [address]  



                                         [place] [address]  



                                         [phone]  







Social History





                                        Date



                 Tobacco Use     Types           Packs/Day       Years Used 

 

                                         



                 Current Every Day Smoker  Cigarettes      1.5             30 

 

    



                     Smokeless Tobacco: Former  Snuff               Quit: 



                                         User   







                    Drinks/Week         oz/Week             Comments



                                         Alcohol Use   

 

                                        



6 Cans of beer            6.0                        



                                         Yes   







 



                           Sex Assigned at Birth     Date Recorded

 

 



                                         Not on file 







                                        Date Recorded



                           COVID-19 Exposure         Response 

 

                                        2021  2:29 PM CDT



                           In the last month, have you been in contact with  No 

/ Unsure 



                                         someone who was confirmed or suspected 

to have  



                                         Coronavirus / COVID-19?  



documented as of this encounter



Functional Status





                                        Date of Assessment



                           Functional Status         Response 

 

                                        2020



                           Does the patient have a hearing impairment:  No 

 

                                        2020



                           Does the patient have a visual impairment:  No 

 

                                        2020



                           Does the patient have impaired ambulation:  Yes 

 

                                        2020



                           Does the patient have an activity of daily living  No

 



                                         (ADL) impairment:  

 

                                        2020



                           Does the patient have an instrumental activity of  No

 



                                         daily living (IADL) impairment:  







                                        Date of Assessment



                           Cognitive Status          Response 

 

                                        2020



                           Does the patient have a cognitive impairment:  No 



documented as of this encounter



Plan of Treatment





Not on filedocumented as of this encounter



Visit Diagnoses

Not on filedocumented in this encounter



Additional Health Concerns





 



                           Assessment                Noted Time

 

 



                           PHQ-2 Depression Total Score: 2  2020 10:40 AM 

CST



documented as of this encounter

## 2021-08-09 NOTE — XMS REPORT
Encounter Summary

                             Created on: 2021



Rock NOLAN Chavez

External Reference #: [id number]

: 1955

Sex: Male



Demographics





                          Address                   [address]

[place]  [address]

 

                          Home Phone                [phone]

 

                          Preferred Language        English

 

                          Marital Status            

 

                          Yazidi Affiliation     1073

 

                          Race                      White

 

                          Ethnic Group              Not  or 





Author





                          Author                    [facility]

 

                          Organization              [facility]

 

                          Address                   Unknown

 

                          Phone                     Unavailable







Support





                Name            Relationship    Address         Phone

 

                Ashlyn MCKEON            Unknown         [phone]







Care Team Providers





                    Care Team Member Name Role                Phone

 

                    Tawil, Elias A MD   Unavailable         [phone]

 

                    Jocy Loomis MD Unavailable         [phone]

 

                    Husam Dailey MD    Unavailable         [phone]

 

                    Self, Referral      PCP                 Unavailable

 

                    Jocy Loomis MD Unavailable         [phone]







Encounter Details





                          Care Team                 Description



                     Date                Type                Department  

 

                                        



Chanell Olvera MA                     Malignant neoplasm of right kidney (HCC)

; 

Prostate cancer (HCC)



                     2021          Orders Only         Oncology: [facility]  



                                         [facility]  



                                         [address] [place] [address].  



                                         [place] [address]  



                                         [phone]  







Social History





                                        Date



                 Tobacco Use     Types           Packs/Day       Years Used 

 

                                         



                 Current Every Day Smoker  Cigarettes      1.5             30 

 

    



                     Smokeless Tobacco: Former  Snuff               Quit: 1985



                                         User   







                    Drinks/Week         oz/Week             Comments



                                         Alcohol Use   

 

                                        



6 Cans of beer            6.0                        



                                         Yes   







 



                           Sex Assigned at Birth     Date Recorded

 

 



                                         Not on file 



documented as of this encounter



Functional Status





                                        Date of Assessment



                           Functional Status         Response 

 

                                        2020



                           Does the patient have a hearing impairment:  No 

 

                                        2020



                           Does the patient have a visual impairment:  No 

 

                                        2020



                           Does the patient have impaired ambulation:  Yes 

 

                                        2020



                           Does the patient have an activity of daily living  No

 



                                         (ADL) impairment:  

 

                                        2020



                           Does the patient have an instrumental activity of  No

 



                                         daily living (IADL) impairment:  







                                        Date of Assessment



                           Cognitive Status          Response 

 

                                        2020



                           Does the patient have a cognitive impairment:  No 



documented as of this encounter



Plan of Treatment





                          Care Team                 Description



                     Date                Type                Specialty  

 

                                        



Jocy Loomis MD



[address] [facility]



[place] [address]



[phone] (Fax)                       



                     2021          Hospital            Oncology  



                                         Encounter   



documented as of this encounter



Procedures





                                        Comments



                 Procedure Name  Priority        Date/Time       Associated Diag

nosis 

 

                                         



                 CT ABD/PELV W CONTRAST  Routine         2021      Maligna

nt neoplasm of 



                                         right kidney (HCC) 



                                         Prostate cancer (HCC) 

 

                                         



                 CT CHEST W CONTRAST  Routine         2021      Malignant 

neoplasm of 



                                         right kidney (HCC) 



                                         Prostate cancer (HCC) 



documented in this encounter



Results

* CT CHEST W CONTRAST (2021)



 



                           Narrative                 Performed At

 

 



                                         This result has an attachment that is n

ot available. 







   



                 Performing Organization  Address         City/State/ZIP Code  P

titus Number

 

   



                                         [facility]   





* CT ABD/PELV W CONTRAST (2021)



 



                           Narrative                 Performed At

 

 



                                         This result has an attachment that is n

ot available. 







   



                 Performing Organization  Address         City/State/ZIP Code  P

titus Number

 

   



                                         [facility]   





documented in this encounter



Visit Diagnoses









                                         Diagnosis

 





                                         Malignant neoplasm of right kidney (HCC

)

 





                                         Prostate cancer (HCC)



                                         Malignant neoplasm of prostate



documented in this encounter



Additional Health Concerns





 



                           Assessment                Noted Time

 

 



                           A fall risk assessment has been completed for the pat

ient  2021  2:34 PM 

CDT

 

 



                           PHQ-2 Depression Total Score: 2  2020 10:40 AM 

CST



documented as of this encounter

## 2021-08-09 NOTE — XMS REPORT
Encounter Summary

                             Created on: 2021



Rock NOLAN Chavez

External Reference #: [id number]

: 1955

Sex: Male



Demographics





                          Address                   [address] [facility]

[place]  [address]

 

                          Home Phone                [phone]

 

                          Preferred Language        English

 

                          Marital Status            

 

                          [employer] Affiliation     [id number]

 

                          Race                      White

 

                          Ethnic Group              Not  or 





Author





                          Author                    [facility]

 

                          Organization              [facility]

 

                          Address                   Unknown

 

                          Phone                     Unavailable







Support





                Name            Relationship    Address         Phone

 

                Ashlyn MCKEON            Unknown         [phone]







Care Team Providers





                    Care Team Member Name Role                Phone

 

                    Tawil, Elias A MD   Unavailable         [phone]

 

                    Jocy Loomis MD Unavailable         [phone]

 

                    Husam Dailey MD    Unavailable         [phone]

 

                    Self, Referral      PCP                 Unavailable

 

                    Jocy Loomis MD Unavailable         [phone]







Encounter Details





                          Care Team                 Description



                     Date                Type                Department  

 

                                        



Jocy Loomis MD



[address]



[place], [place] 



[phone] (Fax)                      Prostate cancer (HCC); 

Malignant neoplasm of right kidney (HCC)



                     2021          Orders Only         Oncology: [facility]  



                                         [address]  



                                         [place], [place]   



                                         [phone]  







Social History





                                        Date



                 Tobacco Use     Types           Packs/Day       Years Used 

 

                                         



                 Current Every Day Smoker  Cigarettes      1.5             30 

 

    



                     Smokeless Tobacco: Former  Snuff               Quit: 



                                         User   







                    Drinks/Week         oz/Week             Comments



                                         Alcohol Use   

 

                                        



6 Cans of beer            6.0                        



                                         Yes   







 



                           Sex Assigned at Birth     Date Recorded

 

 



                                         Not on file 



documented as of this encounter



Functional Status





                                        Date of Assessment



                           Functional Status         Response 

 

                                        2020



                           Does the patient have a hearing impairment:  No 

 

                                        2020



                           Does the patient have a visual impairment:  No 

 

                                        2020



                           Does the patient have impaired ambulation:  Yes 

 

                                        2020



                           Does the patient have an activity of daily living  No

 



                                         (ADL) impairment:  

 

                                        2020



                           Does the patient have an instrumental activity of  No

 



                                         daily living (IADL) impairment:  







                                        Date of Assessment



                           Cognitive Status          Response 

 

                                        2020



                           Does the patient have a cognitive impairment:  No 



documented as of this encounter



Plan of Treatment





                          Care Team                 Description



                     Date                Type                Specialty  

 

                                        



Jocy Loomis MD



[address]



[place] [address]



[phone] (Fax)                       



                     2021          Hospital            Oncology  



                                         Encounter   



documented as of this encounter



Procedures





                                        Comments



                 Procedure Name  Priority        Date/Time       Associated Diag

nosis 

 

                                         



                 CBC AND DIFF    Routine         2021      Prostate cancer

 (HCC) 



                                         Malignant neoplasm of 



                                         right kidney (HCC) 

 

                                         



                 COMPREHENSIVE METABOLIC  Routine         2021      Malign

ant neoplasm of 



                           PANEL                     right kidney (HCC) 



documented in this encounter



Results

* COMPREHENSIVE METABOLIC PANEL (2021)



    



              Component    Value        Ref Range    Performed At  Pathologist



                                         [facility]

 

    



                           Sodium                    [facility] 

 

    



                           Potassium                 [facility] 

 

    



                           Chloride                  [facility] 

 

    



                           CO2                       [facility] 

 

    



                           Blood Urea                [facility] 



                                         Nitrogen    

 

    



                           Creatinine                [facility] 

 

    



                           Glucose                   [facility] 

 

    



                           Calcium                   [facility] 

 

    



                           Total Protein             [facility] 

 

    



                           Total Bilirubin           [facility] 

 

    



                           Albumin                   [facility] 

 

    



                           Alk Phosphatase           [facility] 

 

    



                           AST (SGOT)                [facility] 

 

    



                           ALT (SGPT)                [facility] 

 

    



                           eGFR Non                  [facility] 



                                             



                                         [facility]    

 

    



                           eGFR               [facility] 



                                         [facility]    

 

    



                           Anion Gap                 [facility] 











                                         Specimen

 





                                         Blood - Blood







 



                           Narrative                 Performed At

 

 



                                         This result has an attachment that is n

ot available. 







   



                 Performing Organization  Address         City/State/ZIP Code  P

titus Number

 

   



                     [facility]            [address] [place] [address] 





* CBC AND DIFF (2021)



    



              Component    Value        Ref Range    Performed At  Pathologist



                                         Signature

 

    



                           White Blood               [facility] 



                                         Cells    

 

    



                           RBC                       [facility] 

 

    



                           Hemoglobin                [facility] 

 

    



                           Hematocrit                [facility] 

 

    



                           MCV                       [facility] 

 

    



                           MCH                       [facility] 

 

    



                           MCHC                      [facility] 

 

    



                           Platelet Count            [facility] 

 

    



                           MPV                       [facility] 

 

    



                           RDW                       [facility] 

 

    



                           Neutrophils               [facility] 

 

    



                           Absolute                  [facility] 



                                         Neutrophil    



                                         Count    

 

    



                           Lymphocytes               [facility] 

 

    



                           Absolute Lymph            [facility] 



                                         Count    

 

    



                           Monocytes                 [facility] 

 

    



                           Absolute                  [facility] 



                                         Monocyte Count    

 

    



                           Eosinophil                [facility] 

 

    



                           Absolute                  [facility] 



                                         Eosinophil    



                                         Count    

 

    



                           Basophils                 [facility] 

 

    



                           Absolute                  [facility] 



                                         Basophil Count    

 

    



                           Atypical Lym              [facility] 

 

    



                           Metamyelocyte             [facility] 

 

    



                           Myelocyte                 [facility] 

 

    



                           Promyelocyte              [facility] 

 

    



                           Blast                     [facility] 

 

    



                           RBC Morph                 [facility] 

 

    



                           WBC Morphology            [facility] 











                                         Specimen

 





                                         Blood - Blood







 



                           Narrative                 Performed At

 

 



                                         This result has an attachment that is n

ot available. 







   



                 Performing Organization  Address         City/State/ZIP Code  P

titus Number

 

   



                     [facility]            [address] [place] [address] 





documented in this encounter



Visit Diagnoses









                                         Diagnosis

 





                                         Prostate cancer (HCC)



                                         Malignant neoplasm of prostate

 





                                         Malignant neoplasm of right kidney (HCC

)



documented in this encounter



Additional Health Concerns





 



                           Assessment                Noted Time

 

 



                           A fall risk assessment has been completed for the pat

ient  2021  2:34 PM 

CDT

 

 



                           PHQ-2 Depression Total Score: 2  2020 10:40 AM 

CST



documented as of this encounter

## 2021-08-09 NOTE — XMS REPORT
Encounter Summary

                             Created on: 2021



Rock NOLAN Chavez

External Reference #: [id number]

: 1955

Sex: Male



Demographics





                          Address                   [address]  [address]

 

                          Home Phone                [phone]

 

                          Preferred Language        English

 

                          Marital Status            

 

                          Mu[employer]ism Affiliation     [id number]

 

                          Race                      White

 

                          Ethnic Group              Not  or 





Author





                          Author                    [facility]

 

                          Organization              [facility]

 

                          Address                   Unknown

 

                          Phone                     Unavailable







Support





                Name            Relationship    Address         Phone

 

                Ashlyn MCKEON            Unknown         [phone]







Care Team Providers





                    Care Team Member Name Role                Phone

 

                    Tawil, Elias A MD   Unavailable         [phone]

 

                    Jocy Loomis MD Unavailable         [phone]

 

                    Husam Dailey MD    Unavailable         [phone]

 

                    Self, Referral      PCP                 Unavailable

 

                    Jocy Loomis MD Unavailable         [phone]







Reason for Visit

* 



  



                     Reason              Onset Date          Comments

 

  



                           Medication Refill         2021 









Encounter Details





                          Care Team                 Description



                     Date                Type                Department  

 

                                        



Jocy Loomis MD



[address] [facility]



[place] [address]



[phone] (Fax)                       



                     2021          Refill              Oncology: [facility]  



                                         [address]  



                                         [place] [address]  



                                         [phone]  







Social History





                                        Date



                 Tobacco Use     Types           Packs/Day       Years Used 

 

                                         



                 Current Every Day Smoker  Cigarettes      1.5             30 

 

    



                     Smokeless Tobacco: Former  Snuff               Quit: 



                                         User   







                    Drinks/Week         oz/Week             Comments



                                         Alcohol Use   

 

                                        



6 Cans of beer            6.0                        



                                         Yes   







 



                           Sex Assigned at Birth     Date Recorded

 

 



                                         Not on file 







                                        Date Recorded



                           COVID-19 Exposure         Response 

 

                                        2021  8:46 AM CDT



                           In the last month, have you been in contact with  No 

/ Unsure 



                                         someone who was confirmed or suspected 

to have  



                                         Coronavirus / COVID-19?  



documented as of this encounter



Functional Status





                                        Date of Assessment



                           Functional Status         Response 

 

                                        2020



                           Does the patient have a hearing impairment:  No 

 

                                        2020



                           Does the patient have a visual impairment:  No 

 

                                        2020



                           Does the patient have impaired ambulation:  Yes 

 

                                        2020



                           Does the patient have an activity of daily living  No

 



                                         (ADL) impairment:  

 

                                        2020



                           Does the patient have an instrumental activity of  No

 



                                         daily living (IADL) impairment:  







                                        Date of Assessment



                           Cognitive Status          Response 

 

                                        2020



                           Does the patient have a cognitive impairment:  No 



documented as of this encounter



Ordered Prescriptions





                          Start Date                End Date



                 Prescription    Sig             Dispensed       Refills  

 

                          2021



                 morphine SR (MS CONTIN)  Take one        60 tablet       0  



                           30 mg ER tablet           tablet by    



                                         mouth every    



                                         12 hours    



documented in this encounter



Plan of Treatment





                          Care Team                 Description



                     Date                Type                Specialty  

 

                                        



Jocy Loomis MD



[address] [place], [place] [address] [phone] (Fax)                       



                     2021          Hospital            Oncology  



                                         Encounter   



documented as of this encounter



Visit Diagnoses

Not on filedocumented in this encounter



Discontinued Medications





                          Start Date                End Date



                     Medication          Sig                 Discontinue  



                                         Reason  

 

                          2021



                     morphine SR (MS CONTIN)  Take one            Reorder  



                           30 mg ER tablet           tablet by   



                                         mouth every   



                                         12 hours   



documented as of this encounter



Additional Health Concerns





 



                           Assessment                Noted Time

 

 



                           A fall risk assessment has been completed for the pat

ient  2021  9:56 AM 

CDT

 

 



                           PHQ-2 Depression Total Score: 2  2020 10:40 AM 

CST



documented as of this encounter

## 2021-08-09 NOTE — XMS REPORT
Encounter Summary

                             Created on: 2021



Rock NOLAN Chavez

External Reference #: [id number]

: 1955

Sex: Male



Demographics





                          Address                   [address]  [address]

 

                          Home Phone                [phone]

 

                          Preferred Language        English

 

                          Marital Status            

 

                          Sikh Affiliation     1073

 

                          Race                      White

 

                          Ethnic Group              Not  or 





Author





                          Author                    [facility]

 

                          Organization              [facility]

 

                          Address                   Unknown

 

                          Phone                     Unavailable







Support





                Name            Relationship    Address         Phone

 

                Ashlyn MCKEON            Unknown         [phone]







Care Team Providers





                    Care Team Member Name Role                Phone

 

                    Tawil, Elias A MD   Unavailable         [phone]

 

                    Jocy Loomis MD Unavailable         [phone]

 

                    Husam Dailey MD    Unavailable         [phone]

 

                    Self, Referral      PCP                 Unavailable

 

                    Jocy Loomis MD Unavailable         [phone]







Reason for Visit

* 



 



                           Reason                    Comments

 

 



                                         Follow Up 









Encounter Details





                          Care Team                 Description



                     Date                Type                Department  

 

                                        



Krystina Hidalgo PA-C



[address]



[place], [place] 



[phone] (Fax)                      Prostate cancer (HCC); 

Malignant neoplasm of right kidney (HCC)



                     2021          Office Visit        Oncology: [facility] [address]  



                                         [place], [place]   



                                         [phone]  







Social History





                                        Date



                 Tobacco Use     Types           Packs/Day       Years Used 

 

                                         



                 Current Every Day Smoker  Cigarettes      1.5             30 

 

    



                     Smokeless Tobacco: Former  Snuff               Quit: 



                                         User   







                    Drinks/Week         oz/Week             Comments



                                         Alcohol Use   

 

                                        



6 Cans of beer            6.0                        



                                         Yes   







 



                           Sex Assigned at Birth     Date Recorded

 

 



                                         Not on file 







                                        Date Recorded



                           COVID-19 Exposure         Response 

 

                                        2021  8:46 AM CDT



                           In the last month, have you been in contact with  No 

/ Unsure 



                                         someone who was confirmed or suspected 

to have  



                                         Coronavirus / COVID-19?  



documented as of this encounter



Last Filed Vital Signs





                    Reading             Time Taken          Comments



                                         Vital Sign   

 

                    121/78              2021  9:56 AM CDT  



                                         Blood Pressure   

 

                    62                  2021  9:56 AM CDT  



                                         Pulse   

 

                    36.4 C (97.5 F) 2021  9:56 AM CDT  



                                         Temperature   

 

                    16                  2021  9:56 AM CDT  



                                         Respiratory Rate   

 

                    98%                 2021  9:56 AM CDT  



                                         Oxygen Saturation   

 

                    -                   -                    



                                         Inhaled Oxygen   



                                         Concentration   

 

                    84.2 kg (185 lb 9.6 oz) 2021  9:56 AM CDT  



                                         Weight   

 

                    171.6 cm (5' 7.56") 2021  9:56 AM CDT  



                                         Height   

 

                    28.59               2021  9:56 AM CDT  



                                         Body Mass Index   



documented in this encounter



Functional Status





                                        Date of Assessment



                           Functional Status         Response 

 

                                        2020



                           Does the patient have a hearing impairment:  No 

 

                                        2020



                           Does the patient have a visual impairment:  No 

 

                                        2020



                           Does the patient have impaired ambulation:  Yes 

 

                                        2020



                           Does the patient have an activity of daily living  No

 



                                         (ADL) impairment:  

 

                                        2020



                           Does the patient have an instrumental activity of  No

 



                                         daily living (IADL) impairment:  







                                        Date of Assessment



                           Cognitive Status          Response 

 

                                        2020



                           Does the patient have a cognitive impairment:  No 



documented as of this encounter



Ordered Prescriptions





                          Start Date                End Date



                 Prescription    Sig             Dispensed       Refills  

 

                          2021                 



                 omeprazole DR (PRILOSEC)  Take one        90 capsule      1  



                           40 mg capsule             capsule by    



                                         mouth daily    



                                         before    



                                         breakfast.    



documented in this encounter



Progress Notes

* Krystina Hidalgo PA-C - 2021 10:00 AM CDT



Formatting of this note is different from the original.

Name: Rock NOLAN Chavez          MRN: [id number]      : 1955      AGE: 66 y.o.


DATE OF SERVICE: 2021



Subjective:        

 

Reason for Visit:  Follow Up



Rock NOLAN Chavez is a 66 y.o. male. 



Cancer Staging

Malignant neoplasm of right kidney (HCC)

Staging form: Kidney, AJCC 8th Edition

- Clinical stage from 2020: Stage IV (cT1b, pM1) - Signed by Yeni de la rosa, Jocy COREY MD on 2020



Prostate cancer (HCC)

Staging form: Prostate, AJCC 8th Edition

- Clinical: No stage assigned - Unsigned



Onc Timeline 

Prostate cancer (HCC) 

2020 Initial Diagnosis 

 Prostate cancer (HCC)

 

2020 Biopsy 

 TRUS biopsy, revealing 4 cores (bilateral) positive for grade group 5 prostate 
adenocarcinoma, comprising 5-60% of core tissue

 

2020 Supportive Care 

 OP SUPPORT LEUPROLIDE DEPOT (EVERY 3 MONTHS)

Plan Provider: Jocy Loomis MD

Treatment goal: Control

 

Malignant neoplasm of right kidney (HCC) 

2020 Initial Diagnosis 

 Malignant neoplasm of right kidney (HCC)

 

2020 Biopsy 

 Bone biopsy -- consistent with metastatic clear cell renal cell carcinoma 

 

2020 Cancer Staged 

 Staging form: Kidney, AJCC 8th Edition

- Clinical stage from 2020: Stage IV (cT1b, pM1)

 

2020 - 2021 Chemotherapy 

 OP  NIVOLUMAB + IPILIMUMAB FOLLOWED BY NIVOLUMAB (EVERY 4 WEEKS)

Plan Provider: Jocy Loomis MD

Treatment goal: Palliative 

 

2020 - 2020 Radiation 

 Palliative RT to R iliac bone metastasis over 13 fractions

 

2021 -  Chemotherapy 

 OP  CABOZANTINIB (TABLETS)

Plan Provider: Jocy Loomis MD

Treatment goal: Palliative 

 

2021 -  Chemotherapy 

 OP SUPPORT ZOLEDRONIC ACID (MONTHLY)

Plan Provider: Jocy Loomis MD

Treatment goal: Supportive Care Plan

 



History of Present Illness

Mr. Rock Chavez is a pleasant 66-year-old gentleman who presents today for rusty
oing evaluation of his metastatic renal cell carcinoma for which he is currently
on cabozantinib at 40 mg daily.  He also continues on treatment with leuprolide 
for his high risk prostate cancer.



The patient relates that he is generally doing well and continues to work on a r
egular basis.  He does note some "soreness" in his hands and relates that he has
difficulty opening bottles.  He denies any sores on the palms of his hands or s
oles of his feet and is using urea cream twice daily.



He denies any nausea or vomiting but does have some heartburn despite using omep
razole 20 mg daily.  He is using Tums 2-3 times a day as needed for breakthrough
heartburn symptoms.  His bowels have been fairly regular recently.  He relates 
that food tastes bland but feels that his weight is relatively stable.



His pain is well controlled with the use of morphine twice daily and he relates 
that he only has to use 1 hydrocodoneacetaminophen 3-4 times a week after alysha
d physical labor at work.



He is scheduled to see Dr. Alamo in the near future with regards to his thyroid n
odules



 

Review of Systems 

Constitutional: Negative for activity change, appetite change, chills, fatigue, 
fever and unexpected weight change. 

HENT: Positive for drooling, rhinorrhea and voice change. Negative for dental pr
oblem, hearing loss, mouth sores, nosebleeds, sinus pain, sneezing, sore throat,
tinnitus and trouble swallowing.  

Eyes: Positive for itching and visual disturbance. 

Respiratory: Negative for cough, chest tightness, shortness of breath and wheezi
ng.  

Cardiovascular: Negative for chest pain, palpitations and leg swelling. 

Gastrointestinal: Positive for constipation and diarrhea. Negative for abdominal
pain, blood in stool, nausea and vomiting. 

Genitourinary: Positive for urgency. Negative for decreased urine volume, diffic
ulty urinating, dysuria, flank pain, frequency and hematuria. 

Musculoskeletal: Positive for back pain. Negative for arthralgias, gait problem 
and myalgias. 

Skin: Positive for pallor. Negative for rash and wound. 

Neurological: Positive for dizziness. Negative for syncope, weakness, light-head
edness and headaches. 

Psychiatric/Behavioral: Positive for agitation. Negative for confusion, dysphori
c mood and sleep disturbance. The patient is not nervous/anxious.  



Objective:       

 aspirin EC (ASPIR-81) 81 mg tablet Take 81 mg by mouth every 24 hours. 

 atorvastatin (LIPITOR) 10 mg tablet Take one tablet by mouth daily. 

 cabozantinib (CABOMETYX) 40 mg tablet Take one tablet by mouth daily. Take o
n an empty stomach, at least 1 hour before or 2 hours after food. 

 Calcium-Cholecalciferol (D3) (CALCIUM 500+D) 500 mg(1,250mg) -400 unit chew 
Chew 1 tablet by mouth twice daily. 

 clobetasoL (TEMOVATE) 0.05 % topical cream Please apply a small amount to th
e palms of hands or soles of feet twice per day if you have soreness in spite of
OTC lotion 

 hydroCHLOROthiazide (HYDRODIURIL) 25 mg tablet Take one tablet by mouth ever
y morning. 

 HYDROcodone/acetaminophen (NORCO) 5/325 mg tablet Take two tablets by mouth 
every 4 hours as needed for Pain  May cause constipation; if causes constipation
, can take Senna as a laxative 

 lisinopriL (ZESTRIL) 40 mg tablet Take one tablet by mouth daily. 

 morphine SR (MS CONTIN) 30 mg ER tablet Take one tablet by mouth every 12 ho
urs 

 prochlorperazine (COMPAZINE) 5 mg tablet Take one tablet by mouth every 6 ho
urs as needed for Nausea. 

 sertraline (ZOLOFT) 50 mg tablet Take one tablet by mouth daily. 



Vitals: 

 21 0956 

BP: 121/78 

BP Source: Arm, Left Upper 

Patient Position: Sitting 

Pulse: 62 

Resp: 16 

Temp: 36.4 C (97.5 F) 

TempSrc: Oral 

SpO2: 98% 

Weight: 84.2 kg (185 lb 9.6 oz) 

Height: 171.6 cm (67.56") 

PainSc: Zero 



Body mass index is 28.59 kg/m. 



Pain Score: Zero

 



Fatigue Scale: 0-None



Pain Addressed:  Patient to call office if pain not relieved or worsened and Cur
rent regimen working to control pain.



Patient Evaluated for a Clinical Trial: Patient not eligible for a treatment tri
al (including not needing treatment, needs palliative care, in remission). 



[employer]cology Group performance status is 1, Restricted in physic
ally strenuous activity but ambulatory and able to carry out work of a light or 
sedentary nature, e.g., light house work, office work.



 Physical Exam

Constitutional:  

   General: He is not in acute distress.

   Appearance: Normal appearance. He is well-developed. He is not toxic-appearin
g or diaphoretic. 

HENT: 

   Head: Normocephalic and atraumatic. 

   Mouth/Throat: 

   Comments: Mask covering nose and mouth

Eyes: 

   General: No scleral icterus.

   Extraocular Movements: Extraocular movements intact. 

   Pupils: Pupils are equal, round, and reactive to light. 

Cardiovascular: 

   Rate and Rhythm: Normal rate and regular rhythm. 

   Heart sounds: Normal heart sounds. No murmur. 

Pulmonary: 

   Effort: Pulmonary effort is normal. No respiratory distress. 

   Breath sounds: Normal breath sounds. No wheezing, rhonchi or rales. 

Abdominal: 

   General: Bowel sounds are normal. There is no distension. 

   Palpations: Abdomen is soft. There is no mass. 

   Tenderness: There is no abdominal tenderness. 

Musculoskeletal:    

   General: No swelling. Normal range of motion. 

   Cervical back: Normal range of motion and neck supple. 

Lymphadenopathy: 

   Cervical: No cervical adenopathy. 

Skin:

   General: Skin is warm and dry. 

   Findings: No rash. 

Neurological: 

   General: No focal deficit present. 

   Mental Status: He is alert and oriented to person, place, and time. Mental st
atus is at baseline. 

Psychiatric:    

   Mood and Affect: Mood normal.    

   Behavior: Behavior normal.    

   Thought Content: Thought content normal.    

   Judgment: Judgment normal. 



 



Lab Results 

Component Value Date/Time 

  (L) 2021 09:00 AM 

 K 3.9 2021 09:00 AM 

 BUN 11 2021 09:00 AM 

 CR 1.09 2021 09:00 AM 

  (H) 2021 09:00 AM 

 CA 9.2 2021 09:00 AM 

 AST 28 2021 09:00 AM 

 ALT 28 2021 09:00 AM 

 ALKPHOS 73 2021 09:00 AM 



Lab Results 

Component Value Date 

 PSA 0.53 2021 

 PSA 0.63 2020 

 PSA 0.72 2020 

 PSA 0.95 2020 

 PSA 1.52 2020 



 

Assessment and Plan:



Mr. Rock Chavez is a 66-year-old gentleman with a past medical history signifi
cant for CAD, hypertension and hyperlipidemia who presents today for ongoing aretha
luation of his prostate cancer as well as his metastatic renal cell carcinoma.



1.  Metastatic renal cell carcinoma.

-Patient presents today for a lab and symptom check while on treatment with cabo
zantinib at 40 mg daily.

-Reviewed CMP which is stable and appropriate for continuing with cabozantinib d
aily.

-Initiate zoledronic acid today.

-Return to clinic in 4 weeks with repeat labs for symptom and blood pressure alice
ck.  Repeat imaging every 3 months.



2.  Grade group 5 prostate cancer.

-PSA is stable suggesting ongoing cancer control.

-Continue ADT indefinitely.  Proceed with next leuprolide injection today.



3.  [place] metastatic disease.

-Initiate monthly zoledronic acid today.

-Discussed risks of hypocalcemia as well as ONJ and discussed the importance of 
contacting us prior to any invasive dental work.



4.  Cancer related pain.

-Continue morphine SR 30 mg every 12 as well as hydrocodone/APAP 10/325 every 4 
hours as needed



5.  Palmar plantar erythrodysesthesia.

-Currently managed with the use of urea as well as clobetasol cream twice daily



6.  GERD.

-Increase omeprazole to 40 mg daily and use Tums as needed



7.  Thyroid nodules.

-Has upcoming consultation with ENT and IR biopsy.



Krystina Hidalgo PA-C



Supervising physician:  Jocy Loomis MD

 



 



 





Electronically signed by Krystina Hidalgo PA-C at 2021  2:38 PM CDT

documented in this encounter



Plan of Treatment





                          Care Team                 Description



                     Date                Type                Specialty  

 

                                        



Jocy Loomis MD



[address] [place], [place] [id number] [phone] (Fax)                       



                     2021          Hospital            Oncology  



                                         Encounter   



documented as of this encounter



Visit Diagnoses









                                         Diagnosis

 





                                         Prostate cancer (HCC)



                                         Malignant neoplasm of prostate

 





                                         Malignant neoplasm of right kidney (HCC

)



documented in this encounter



Additional Health Concerns





 



                           Assessment                Noted Time

 

 



                           A fall risk assessment has been completed for the pat

ient  2021  9:56 AM 

CDT

 

 



                           PHQ-2 Depression Total Score: 2  2020 10:40 AM 

CST



documented as of this encounter

## 2021-08-09 NOTE — XMS REPORT
Encounter Summary

                             Created on: 2021



Rock NOLAN Chavez

External Reference #: [id number]

: 1955

Sex: Male



Demographics





                          Address                   [address]  [address]

 

                          Home Phone                [phone]

 

                          Preferred Language        English

 

                          Marital Status            

 

                          Jew Affiliation     1073

 

                          Race                      White

 

                          Ethnic Group              Not  or 





Author





                          Author                    [facility]

 

                          Organization              [facility]

 

                          Address                   Unknown

 

                          Phone                     Unavailable







Support





                Name            Relationship    Address         Phone

 

                Ashlyn MCKEON            Unknown         [phone]







Care Team Providers





                    Care Team Member Name Role                Phone

 

                    Tawil, Elias A MD   Unavailable         [phone]

 

                    Jocy Loomis MD Unavailable         [phone]

 

                    Husam Dailey MD    Unavailable         [phone]

 

                    Self, Referral      PCP                 Unavailable

 

                    Jocy Loomis MD Unavailable         [phone]







Encounter Details





                          Care Team                 Description



                     Date                Type                Department  

 

                                        



Jocy Loomis MD



[address] [facility]



[place], [place] 



[phone] (Fax)                      Prostate cancer (HCC)



                     2021          Orders Only         Oncology: [facility]  



                                         [address]  



                                         [place], [place]   



                                         [phone]  







Social History





                                        Date



                 Tobacco Use     Types           Packs/Day       Years Used 

 

                                         



                 Current Every Day Smoker  Cigarettes      1.5             30 

 

    



                     Smokeless Tobacco: Former  Snuff               Quit: 1985



                                         User   







                    Drinks/Week         oz/Week             Comments



                                         Alcohol Use   

 

                                        



6 Cans of beer            6.0                        



                                         Yes   







 



                           Sex Assigned at Birth     Date Recorded

 

 



                                         Not on file 



documented as of this encounter



Functional Status





                                        Date of Assessment



                           Functional Status         Response 

 

                                        2020



                           Does the patient have a hearing impairment:  No 

 

                                        2020



                           Does the patient have a visual impairment:  No 

 

                                        2020



                           Does the patient have impaired ambulation:  Yes 

 

                                        2020



                           Does the patient have an activity of daily living  No

 



                                         (ADL) impairment:  

 

                                        2020



                           Does the patient have an instrumental activity of  No

 



                                         daily living (IADL) impairment:  







                                        Date of Assessment



                           Cognitive Status          Response 

 

                                        2020



                           Does the patient have a cognitive impairment:  No 



documented as of this encounter



Plan of Treatment





                          Care Team                 Description



                     Date                Type                Specialty  

 

                                        



Jocy Loomis MD



[address] [place], [place] [address] [phone] (Fax)                       



                     2021          Hospital            Oncology  



                                         Encounter   



documented as of this encounter



Procedures





                                        Comments



                 Procedure Name  Priority        Date/Time       Associated Diag

nosis 

 

                                         



                 US DOPPLER VENOUS LEFT  Routine         2021      Prostat

e cancer (HCC) 



documented in this encounter



Results

* US DOPPLER VENOUS LEFT (2021)







                                         Specimen

 









 



                           Narrative                 Performed At

 

 



                                         This result has an attachment that is n

ot available. 







   



                 Performing Organization  Address         City/State/ZIP Code  P

titus Number

 

   



                                         [facility]   





documented in this encounter



Visit Diagnoses









                                         Diagnosis

 





                                         Prostate cancer (HCC)



                                         Malignant neoplasm of prostate



documented in this encounter



Additional Health Concerns





 



                           Assessment                Noted Time

 

 



                           A fall risk assessment has been completed for the pat

ient  2021  2:34 PM 

CDT

 

 



                           PHQ-2 Depression Total Score: 2  2020 10:40 AM 

CST



documented as of this encounter

## 2021-08-09 NOTE — XMS REPORT
Encounter Summary

                             Created on: 2021



Rock NOLAN Chavez

External Reference #: [id number]

: 1955

Sex: Male



Demographics





                          Address                   [address]  [address]

 

                          Home Phone                [phone]

 

                          Preferred Language        English

 

                          Marital Status            

 

                          Ep[employer] Affiliation     1073

 

                          Race                      White

 

                          Ethnic Group              Not  or 





Author





                          Author                    [facility]

 

                          Organization              [facility]

 

                          Address                   Unknown

 

                          Phone                     Unavailable







Support





                Name            Relationship    Address         Phone

 

                Ashlyn MCKEON            Unknown         [phone]







Care Team Providers





                    Care Team Member Name Role                Phone

 

                    Tawil, Elias A MD   Unavailable         [phone]

 

                    Jocy Loomis MD Unavailable         [phone]

 

                    Husam Dailey MD    Unavailable         [phone]

 

                    Self, Referral      PCP                 Unavailable

 

                    Jocy Loomis MD Unavailable         [phone]







Encounter Details





                          Care Team                 Description



                     Date                Type                Department  

 

                                        



Jocy Loomis MD



[address] [facility]



[place] [address]



[phone] (Fax)                       



                     2021          Documentation       Oncology: [facility] [address]  



                                         [place] [address]  



                                         [phone]  







Social History





                                        Date



                 Tobacco Use     Types           Packs/Day       Years Used 

 

                                         



                 Current Every Day Smoker  Cigarettes      1.5             30 

 

    



                     Smokeless Tobacco: Former  Snuff               Quit: 



                                         User   







                    Drinks/Week         oz/Week             Comments



                                         Alcohol Use   

 

                                        



6 Cans of beer            6.0                        



                                         Yes   







 



                           Sex Assigned at Birth     Date Recorded

 

 



                                         Not on file 







                                        Date Recorded



                           COVID-19 Exposure         Response 

 

                                        2021  8:46 AM CDT



                           In the last month, have you been in contact with  No 

/ Unsure 



                                         someone who was confirmed or suspected 

to have  



                                         Coronavirus / COVID-19?  



documented as of this encounter



Functional Status





                                        Date of Assessment



                           Functional Status         Response 

 

                                        2020



                           Does the patient have a hearing impairment:  No 

 

                                        2020



                           Does the patient have a visual impairment:  No 

 

                                        2020



                           Does the patient have impaired ambulation:  Yes 

 

                                        2020



                           Does the patient have an activity of daily living  No

 



                                         (ADL) impairment:  

 

                                        2020



                           Does the patient have an instrumental activity of  No

 



                                         daily living (IADL) impairment:  







                                        Date of Assessment



                           Cognitive Status          Response 

 

                                        2020



                           Does the patient have a cognitive impairment:  No 



documented as of this encounter



Progress Notes

* Francine Camacho RN - 2021  2:12 PM CDT



Formatting of this note might be different from the original.

Lab orders faxed to [facility] for tomorrow since pt changed appt to 
[facility] on Monday.



Electronically signed by Francine Camacho RN at 2021  2:13 PM CDT

documented in this encounter



Plan of Treatment





                          Care Team                 Description



                     Date                Type                Specialty  

 

                                        



Jocy Loomis MD



[address] [place], [place] [address] [phone] (Fax)                       



                     2021          Hospital            Oncology  



                                         Encounter   



documented as of this encounter



Visit Diagnoses

Not on filedocumented in this encounter



Additional Health Concerns





 



                           Assessment                Noted Time

 

 



                           A fall risk assessment has been completed for the pat

ient  2021  9:56 AM 

CDT

 

 



                           PHQ-2 Depression Total Score: 2  2020 10:40 AM 

CST



documented as of this encounter

## 2021-08-09 NOTE — XMS REPORT
Encounter Summary

                             Created on: 2021



Rock NOLAN Chavez

External Reference #: [id number]

: 1955

Sex: Male



Demographics





                          Address                   [address]

[place]  [address]

 

                          Home Phone                [phone]

 

                          Preferred Language        English

 

                          Marital Status            

 

                          Latter-day Affiliation     1073

 

                          Race                      White

 

                          Ethnic Group              Not  or 





Author





                          Author                    [facility]

 

                          Organization              [facility]

 

                          Address                   Unknown

 

                          Phone                     Unavailable







Support





                Name            Relationship    Address         Phone

 

                Ashlyn MCKEON            Unknown         [phone]







Care Team Providers





                    Care Team Member Name Role                Phone

 

                    Tawil, Elias A MD   Unavailable         [phone]

 

                    Jocy Loomis MD Unavailable         [phone]

 

                    Husam Dailey MD    Unavailable         [phone]

 

                    Self, Referral      PCP                 Unavailable

 

                    Jocy Loomis MD Unavailable         [phone]







Reason for Visit

* 



 



                           Reason                    Comments

 

 



                           Treatment                 Zometa





* Treatment (Routine)



                          Referred By Contact       Referred To Contact



                 Status          Reason          Specialty       Diagnoses /  



                                         Procedures  

 

                                        



Jocy Loomis MD



[address] [place] [address]



Phone: [phone]



Fax: [phone]                       



[facility] 



[address] [facility].



[address], [address]



[place], [place] 



Phone: [phone]



Fax: [phone]



                           Authorized                Diagnoses  



                                         Prostate cancer  



                                         (HCC)  



                                         Long term  



                                         (current) use of  



                                         other agents  



                                         affecting estrogen  



                                         receptors and  



                                         estrogen levels  



                                         Malignant neoplasm  



                                         of right kidney,  



                                         except renal  



                                         pelvis (HCC)  



                                         Secondary  



                                         malignant neoplasm  



                                         of bone (HCC)

  



                                         P  



                                         rocedures  



                                         leuprolide(+) 3  



                                         month (LUPRON  



                                         DEPOT)  



                                         leuprolide(+) 6  



                                         month (LUPRON  



                                         DEPOT)  











Encounter Details





                          Care Team                 Description



                     Date                Type                Department  

 

                                        



Krystina Hidalgo PA-C



[address] [place] [address]



[phone] (Fax)                       



                     2021          Hospital            Oncology: [facility]  



                                         [address]  



                                         [address]  



                                         [place] [address]  



                                         [phone]  







Social History





                                        Date



                 Tobacco Use     Types           Packs/Day       Years Used 

 

                                         



                 Current Every Day Smoker  Cigarettes      1.5             30 

 

    



                     Smokeless Tobacco: Former  Snuff               Quit: 



                                         User   







                    Drinks/Week         oz/Week             Comments



                                         Alcohol Use   

 

                                        



6 Cans of beer            6.0                        



                                         Yes   







 



                           Sex Assigned at Birth     Date Recorded

 

 



                                         Not on file 







                                        Date Recorded



                           COVID-19 Exposure         Response 

 

                                        2021  8:46 AM CDT



                           In the last month, have you been in contact with  No 

/ Unsure 



                                         someone who was confirmed or suspected 

to have  



                                         Coronavirus / COVID-19?  



documented as of this encounter



Functional Status





                                        Date of Assessment



                           Functional Status         Response 

 

                                        2020



                           Does the patient have a hearing impairment:  No 

 

                                        2020



                           Does the patient have a visual impairment:  No 

 

                                        2020



                           Does the patient have impaired ambulation:  Yes 

 

                                        2020



                           Does the patient have an activity of daily living  No

 



                                         (ADL) impairment:  

 

                                        2020



                           Does the patient have an instrumental activity of  No

 



                                         daily living (IADL) impairment:  







                                        Date of Assessment



                           Cognitive Status          Response 

 

                                        2020



                           Does the patient have a cognitive impairment:  No 



documented as of this encounter



Medications at Time of Discharge





                          Start Date                End Date



                 Medication      Sig             Dispensed       Refills  

 

                                                     



                     aspirin EC (ASPIR-81) 81  Take 81 mg by       0  



                           mg tablet                 mouth every    



                                         24 hours.    

 

                          2021                 



                 atorvastatin (LIPITOR) 10  Take one        90 tablet       3  



                           mg tablet                 tablet by    



                                         mouth daily.    

 

                          2021                 



                 cabozantinib (CABOMETYX)  Take one        30 tablet       3  



                           40 mg tabletIndications:  tablet by    



                           Malignant neoplasm of     mouth daily.    



                           right kidney (HCC)        Take on an    



                                         empty    



                                         stomach, at    



                                         least 1 hour    



                                         before or 2    



                                         hours after    



                                         food.    

 

                                                     



                     Calcium-Cholecalciferol  Chew 1 tablet       0  



                           (D3) (CALCIUM 500+D) 500  by mouth    



                           mg(1,250mg) -400 unit     twice daily.    



                                         chew     

 

                          2021                 



                 clobetasoL (TEMOVATE)  Please apply    60 g            3  



                           0.05 % topical            a small    



                           creamIndications: Palmar  amount to the    



                           plantar                   palms of    



                           erythrodysaesthesia due   hands or    



                           to cytotoxic therapy      soles of feet    



                                         twice per day    



                                         if you have    



                                         soreness in    



                                         spite of OTC    



                                         lotion    

 

                          2021                 



                 hydroCHLOROthiazide  Take one        90 tablet       3  



                           (HYDRODIURIL) 25 mg       tablet by    



                           tablet                    mouth every    



                                         morning.    

 

                          2021                 



                 lisinopriL (ZESTRIL) 40  Take one        90 tablet       3  



                           mg tablet                 tablet by    



                                         mouth daily.    

 

                          2021                 



                 omeprazole DR (PRILOSEC)  Take one        90 capsule      1  



                           40 mg capsule             capsule by    



                                         mouth daily    



                                         before    



                                         breakfast.    

 

                          2020                 



                 prochlorperazine  Take one        30 tablet       1  



                           (COMPAZINE) 5 mg tablet   tablet by    



                                         mouth every 6    



                                         hours as    



                                         needed for    



                                         Nausea.    

 

                          2021                 



                 sertraline (ZOLOFT) 50 mg  Take one        30 tablet       3  



                           tablet                    tablet by    



                                         mouth daily.    

 

                          2021



                 HYDROcodone/acetaminophen  Take two        120 tablet      0  



                           (NORCO) 5/325 mg          tablets by    



                           tabletIndications:        mouth every 4    



                           Neoplasm related pain     hours as    



                                         needed for    



                                         Pain  May    



                                         cause    



                                         constipation;    



                                         if causes    



                                         constipation,    



                                         can take    



                                         Senna as a    



                                         laxative    

 

                          2021



                 morphine SR (MS CONTIN)  Take one        60 tablet       0  



                           30 mg ER tablet           tablet by    



                                         mouth every    



                                         12 hours    



documented as of this encounter



Discharge Disposition





                    Code                Departure Means     Destination



                                         Disposition   

 

                                                            Home



                                         Home or Self Care   



documented in this encounter



Progress Notes

* Terri Reeves RN - 2021 12:26 PM CDT



Formatting of this note might be different from the original.

Pt here for zometa and lupron.  Pt seen in exam by MICHELLE Connelly, no new qu
estions or concerns at this time.  Pt tolerated zometa infusion and lupron injec
tion without incident.  Pt denies need for AVS, left ambulatory at 1224.



Electronically signed by Terri Reeves RN at 2021 12:26 PM CDT

documented in this encounter



Miscellaneous Notes

* Addendum Note - Leigh Collins - 2021 12:15 PM CDT



 Encounter addended by: Leigh Collins on: 6/15/2021 1:21 PM

 Actions taken: Charge Capture section accepted  



Electronically signed by Leigh Collins at 06/15/2021  1:21 PM CDT

documented in this encounter



Plan of Treatment





                          Care Team                 Description



                     Date                Type                Specialty  

 

                                        



[facility]



[address] [place] [address]



[phone] (Fax)                       



                     2021          Hospital            Oncology  



                                         Encounter   



documented as of this encounter



Visit Diagnoses









                                         Diagnosis

 





                                         Prostate cancer (HCC) - Primary



                                         Malignant neoplasm of prostate

 





                                         Malignant neoplasm of right kidney (HCC

)



documented in this encounter



Administered Medications





                Action Date     Dose            Rate            Site



                           Medication Order          MAR Action    

 

                2021 12:21 PM CDT 22.5 mg                         Gluteal,

 Right



                           leuprolide(+) 3 month (LUPRON DEPOT)  Given    



                                         injection 22.5 mg     



                                         22.5 mg, Intramuscular, ONCE, 1 dose,  

   



                                         21 at 1123, For Intramuscular 

    



                                         use ONLY. NOTE: This is a HIGH ALERT   

  



                                         Medication.,     

 

  

 

                2021 11:58 AM CDT 4 mg                             



                           zoledronic acid (ZOMETA) IVPB 4 mg  Given - New    



                           4 mg, Intravenous, 100 mL, Administer  Bag    



                                         over 15 Minutes, ONCE, 1 dose, 21 at 1124     

 

  



documented in this encounter



Orders





                                        First Ordered Date



                     Nursing             Count               Last Ordered Date 

 

                                         



                     IMPLEMENT ALTEPLASE CENTRAL VENOUS  1                    



                                         ACCESS DEVICE DECLOTTING PROTOCOL   

 

                                         



                     IMPLEMENT BISPHOSPHONATE RENAL DOSING  1                   

2021 



                                         PROTOCOL   

 

                                         



                     IMPLEMENT CHEMOTHERAPY EXTRAVASATION  1                   0

2021 



                                         MANAGEMENT PROTOCOL   

 

                                         



                     IMPLEMENT GENERAL IV LINE FLUSH PROTOCOL  1                

   2021 

 

                                         



                     IMPLEMENT MEDICATION REACTION  1                   20 



                                         ANAPHYLAXIS, AND HYPERSENSITIVITY NILA

C   



documented in this encounter



Additional Health Concerns





 



                           Assessment                Noted Time

 

 



                           A fall risk assessment has been completed for the pat

ient  2021  9:56 AM 

CDT

 

 



                           PHQ-2 Depression Total Score: 2  2020 10:40 AM 

CST



documented as of this encounter

## 2021-08-09 NOTE — XMS REPORT
Encounter Summary

                             Created on: 2021



Rock NOLAN Chavez

External Reference #: [id number]

: 1955

Sex: Male



Demographics





                          Address                   [address]

[place]  [address]

 

                          Home Phone                [phone]

 

                          Preferred Language        English

 

                          Marital Status            

 

                          Hindu Affiliation     [id number]

 

                          Race                      White

 

                          Ethnic Group              Not  or 





Author





                          Author                    [facility]

 

                          Organization              [facility]

 

                          Address                   Unknown

 

                          Phone                     Unavailable







Support





                Name            Relationship    Address         Phone

 

                Ashlyn MCKEON            Unknown         [phone]







Care Team Providers





                    Care Team Member Name Role                Phone

 

                    Tawil, Elias A MD   Unavailable         [phone]

 

                    Jocy Loomis MD Unavailable         [phone]

 

                    Husam Dailey MD    Unavailable         [phone]

 

                    Self, Referral      PCP                 Unavailable

 

                    Jocy Loomis MD Unavailable         [phone]







Encounter Details





                          Care Team                 Description



                     Date                Type                Department  

 

                                                     



                           2021                Travel   







Social History





                                        Date



                 Tobacco Use     Types           Packs/Day       Years Used 

 

                                         



                 Current Every Day Smoker  Cigarettes      1.5             30 

 

    



                     Smokeless Tobacco: Former  Snuff               Quit: 



                                         User   







                    Drinks/Week         oz/Week             Comments



                                         Alcohol Use   

 

                                        



6 Cans of beer            6.0                        



                                         Yes   







 



                           Sex Assigned at Birth     Date Recorded

 

 



                                         Not on file 







                                        Date Recorded



                           COVID-19 Exposure         Response 

 

                                        2021  2:29 PM CDT



                           In the last month, have you been in contact with  No 

/ Unsure 



                                         someone who was confirmed or suspected 

to have  



                                         Coronavirus / COVID-19?  



documented as of this encounter



Functional Status





                                        Date of Assessment



                           Functional Status         Response 

 

                                        2020



                           Does the patient have a hearing impairment:  No 

 

                                        2020



                           Does the patient have a visual impairment:  No 

 

                                        2020



                           Does the patient have impaired ambulation:  Yes 

 

                                        2020



                           Does the patient have an activity of daily living  No

 



                                         (ADL) impairment:  

 

                                        2020



                           Does the patient have an instrumental activity of  No

 



                                         daily living (IADL) impairment:  







                                        Date of Assessment



                           Cognitive Status          Response 

 

                                        2020



                           Does the patient have a cognitive impairment:  No 



documented as of this encounter



Plan of Treatment





                          Care Team                 Description



                     Date                Type                Specialty  

 

                                        



Jocy Loomis MD



[address] [facility] [place] [address]



[phone] (Fax)                       



                     2021          Hospital            Oncology  



                                         Encounter   



documented as of this encounter



Visit Diagnoses

Not on filedocumented in this encounter



Additional Health Concerns





 



                           Assessment                Noted Time

 

 



                           PHQ-2 Depression Total Score: 2  2020 10:40 AM 

CST



documented as of this encounter

## 2021-08-09 NOTE — XMS REPORT
Encounter Summary

                             Created on: 2021



Rock NOLAN Chavez

External Reference #: [id number]

: 1955

Sex: Male



Demographics





                          Address                   [address]  [address]

 

                          Home Phone                [phone]

 

                          Preferred Language        English

 

                          Marital Status            

 

                          Holiness Affiliation     1073

 

                          Race                      White

 

                          Ethnic Group              Not  or 





Author





                          Author                    [facility]

 

                          Organization              [facility]

 

                          Address                   Unknown

 

                          Phone                     Unavailable







Support





                Name            Relationship    Address         Phone

 

                Ashlyn MCKEON            Unknown         [phone]







Care Team Providers





                    Care Team Member Name Role                Phone

 

                    Tawil, Elias A MD   Unavailable         [phone]

 

                    Jocy Loomis MD Unavailable         [phone]

 

                    Husam Dailey MD    Unavailable         [phone]

 

                    Self, Referral      PCP                 Unavailable

 

                    Jocy Loomis MD Unavailable         [phone]







Encounter Details





                          Care Team                 Description



                     Date                Type                Department  

 

                                        



Jocy Loomis MD



[address] [facility]



[place] [address]



[phone] (Fax)                       



                     2021          Documentation       Oncology: [facility]  



                                         [address]  



                                         [place] [address]  



                                         [phone]  







Social History





                                        Date



                 Tobacco Use     Types           Packs/Day       Years Used 

 

                                         



                 Current Every Day Smoker  Cigarettes      1.5             30 

 

    



                     Smokeless Tobacco: Former  Snuff               Quit: 



                                         User   







                    Drinks/Week         oz/Week             Comments



                                         Alcohol Use   

 

                                        



6 Cans of beer            6.0                        



                                         Yes   







 



                           Sex Assigned at Birth     Date Recorded

 

 



                                         Not on file 



documented as of this encounter



Functional Status





                                        Date of Assessment



                           Functional Status         Response 

 

                                        2020



                           Does the patient have a hearing impairment:  No 

 

                                        2020



                           Does the patient have a visual impairment:  No 

 

                                        2020



                           Does the patient have impaired ambulation:  Yes 

 

                                        2020



                           Does the patient have an activity of daily living  No

 



                                         (ADL) impairment:  

 

                                        2020



                           Does the patient have an instrumental activity of  No

 



                                         daily living (IADL) impairment:  







                                        Date of Assessment



                           Cognitive Status          Response 

 

                                        2020



                           Does the patient have a cognitive impairment:  No 



documented as of this encounter



Progress Notes

* Guerda Christina RN - 2021  3:16 PM CDT



Formatting of this note might be different from the original.

Faxed orders for US left LE to [facility] at [phone].



Electronically signed by Guerda Christina RN at 2021  3:17 PM CDT

documented in this encounter



Plan of Treatment





                          Care Team                 Description



                     Date                Type                Specialty  

 

                                        



Jocy Loomis MD



[address] [facility]



Cancer [place], [place] [id number] [phone] (Fax)                       



                     2021          Hospital            Oncology  



                                         Encounter   



documented as of this encounter



Visit Diagnoses

Not on filedocumented in this encounter



Additional Health Concerns





 



                           Assessment                Noted Time

 

 



                           A fall risk assessment has been completed for the pat

ient  2021  2:34 PM 

CDT

 

 



                           PHQ-2 Depression Total Score: 2  2020 10:40 AM 

CST



documented as of this encounter

## 2021-08-09 NOTE — XMS REPORT
Encounter Summary

                             Created on: 2021



Rock NOLAN Chavez

External Reference #: [id number]

: 1955

Sex: Male



Demographics





                          Address                   [address]  [address]

 

                          Home Phone                [phone]

 

                          Preferred Language        English

 

                          Marital Status            

 

                          Taoism Affiliation     1073

 

                          Race                      White

 

                          Ethnic Group              Not  or 





Author





                          Author                    [facility]

 

                          Organization              [facility]

 

                          Address                   Unknown

 

                          Phone                     Unavailable







Support





                Name            Relationship    Address         Phone

 

                Ashlyn MCKEON            Unknown         [phone]







Care Team Providers





                    Care Team Member Name Role                Phone

 

                    Tawil, Elias A MD   Unavailable         [phone]

 

                    Jocy Loomis MD Unavailable         [phone]

 

                    Husam Dailey MD    Unavailable         [phone]

 

                    Self, Referral      PCP                 Unavailable

 

                    Jocy Loomis MD Unavailable         [phone]







Encounter Details





                          Care Team                 Description



                     Date                Type                Department  

 

                                        



Krystina Hidalgo PA-C



[address] [facility]



[place] 



[phone] (Fax)                      Malignant neoplasm of right kidney (HCC)

; 

Prostate cancer (HCC)



                     2021          Orders Only         Oncology: [facility]  



                                         [address]  



                                         [place], [place]   



                                         [phone]  







Social History





                                        Date



                 Tobacco Use     Types           Packs/Day       Years Used 

 

                                         



                 Current Every Day Smoker  Cigarettes      1.5             30 

 

    



                     Smokeless Tobacco: Former  Snuff               Quit: 



                                         User   







                    Drinks/Week         oz/Week             Comments



                                         Alcohol Use   

 

                                        



6 Cans of beer            6.0                        



                                         Yes   







 



                           Sex Assigned at Birth     Date Recorded

 

 



                                         Not on file 



documented as of this encounter



Functional Status





                                        Date of Assessment



                           Functional Status         Response 

 

                                        2020



                           Does the patient have a hearing impairment:  No 

 

                                        2020



                           Does the patient have a visual impairment:  No 

 

                                        2020



                           Does the patient have impaired ambulation:  Yes 

 

                                        2020



                           Does the patient have an activity of daily living  No

 



                                         (ADL) impairment:  

 

                                        2020



                           Does the patient have an instrumental activity of  No

 



                                         daily living (IADL) impairment:  







                                        Date of Assessment



                           Cognitive Status          Response 

 

                                        2020



                           Does the patient have a cognitive impairment:  No 



documented as of this encounter



Plan of Treatment





                          Care Team                 Description



                     Date                Type                Specialty  

 

                                        



Jocy Loomis MD



[address] [place] [address]



[phone] (Fax)                       



                     2021          Hospital            Oncology  



                                         Encounter   



documented as of this encounter



Procedures





                                        Comments



                 Procedure Name  Priority        Date/Time       Associated Diag

nosis 

 

                                         



                 MRI HEAD WO/W CONTRAST  Routine         2021      Maligna

nt neoplasm of 



                                         right kidney (HCC) 



                                         Prostate cancer (HCC) 



documented in this encounter



Results

* MRI HEAD WO/W CONTRAST (2021)



 



                           Narrative                 Performed At

 

 



                                         This result has an attachment that is n

ot available. 







   



                 Performing Organization  Address         City/State/ZIP Code  P

titus Number

 

   



                                         [facility]   





documented in this encounter



Visit Diagnoses









                                         Diagnosis

 





                                         Malignant neoplasm of right kidney (HCC

)

 





                                         Prostate cancer (HCC)



                                         Malignant neoplasm of prostate



documented in this encounter



Additional Health Concerns





 



                           Assessment                Noted Time

 

 



                           A fall risk assessment has been completed for the pat

ient  2021  2:34 PM 

CDT

 

 



                           PHQ-2 Depression Total Score: 2  2020 10:40 AM 

CST



documented as of this encounter

## 2021-08-09 NOTE — XMS REPORT
Encounter Summary

                             Created on: 2021



Rock NOLAN Chavez

External Reference #: [id number]

: 1955

Sex: Male



Demographics





                          Address                   [address]  [address]

 

                          Home Phone                [phone]

 

                          Preferred Language        English

 

                          Marital Status            

 

                          Jain Affiliation     [id number]

 

                          Race                      White

 

                          Ethnic Group              Not  or 





Author





                          Author                    [facility]

 

                          Organization              [facility]

 

                          Address                   Unknown

 

                          Phone                     Unavailable







Support





                Name            Relationship    Address         Phone

 

                Ashlyn MCKEON            Unknown         [phone]







Care Team Providers





                    Care Team Member Name Role                Phone

 

                    Tawil, Elias A MD   Unavailable         [phone]

 

                    Jocy Loomis MD Unavailable         [phone]

 

                    Husam Dailey MD    Unavailable         [phone]

 

                    Self, Referral      PCP                 Unavailable

 

                    Jocy Loomis MD Unavailable         [phone]







Encounter Details





                          Care Team                 Description



                     Date                Type                Department  

 

                                        



Ok Alamo MD



 [address]



[facility] [address]



[place], [place] [address] [phone] (Fax)                       



                     2021          Orders Only         Otolaryngology: [facility]  



                                         [address]  



                                         [address]  



                                         [place], [place]  



                                         [address] [phone]  







Social History





                                        Date



                 Tobacco Use     Types           Packs/Day       Years Used 

 

                                         



                 Current Every Day Smoker  Cigarettes      1.5             30 

 

    



                     Smokeless Tobacco: Former  Snuff               Quit: 



                                         User   







                    Drinks/Week         oz/Week             Comments



                                         Alcohol Use   

 

                                        



6 Cans of beer            6.0                        



                                         Yes   







 



                           Sex Assigned at Birth     Date Recorded

 

 



                                         Not on file 







                                        Date Recorded



                           COVID-19 Exposure         Response 

 

                                        2021  8:46 AM CDT



                           In the last month, have you been in contact with  No 

/ Unsure 



                                         someone who was confirmed or suspected 

to have  



                                         Coronavirus / COVID-19?  



documented as of this encounter



Functional Status





                                        Date of Assessment



                           Functional Status         Response 

 

                                        2020



                           Does the patient have a hearing impairment:  No 

 

                                        2020



                           Does the patient have a visual impairment:  No 

 

                                        2020



                           Does the patient have impaired ambulation:  Yes 

 

                                        2020



                           Does the patient have an activity of daily living  No

 



                                         (ADL) impairment:  

 

                                        2020



                           Does the patient have an instrumental activity of  No

 



                                         daily living (IADL) impairment:  







                                        Date of Assessment



                           Cognitive Status          Response 

 

                                        2020



                           Does the patient have a cognitive impairment:  No 



documented as of this encounter



Plan of Treatment





                          Care Team                 Description



                     Date                Type                Specialty  

 

                                        



Jocy Loomis MD



[address] [place], [place] [address] [phone] (Fax)                       



                     2021          Hospital            Oncology  



                                         Encounter   



documented as of this encounter



Visit Diagnoses

Not on filedocumented in this encounter



Additional Health Concerns





 



                           Assessment                Noted Time

 

 



                           A fall risk assessment has been completed for the pat

ient  2021  9:56 AM 

CDT

 

 



                           PHQ-2 Depression Total Score: 2  2020 10:40 AM 

CST



documented as of this encounter

## 2021-08-09 NOTE — XMS REPORT
Encounter Summary

                             Created on: 2021



Rock NOLAN Chavez

External Reference #: [id number]

: 1955

Sex: Male



Demographics





                          Address                   [address] [facility]

[place]  [address]

 

                          Home Phone                [phone]

 

                          Preferred Language        English

 

                          Marital Status            

 

                          Pentecostal Affiliation     [id number]

 

                          Race                      White

 

                          Ethnic Group              Not  or 





Author





                          Author                    [facility]

 

                          Organization              [facility]

 

                          Address                   Unknown

 

                          Phone                     Unavailable







Support





                Name            Relationship    Address         Phone

 

                Ashlyn MCKEON            Unknown         [phone]







Care Team Providers





                    Care Team Member Name Role                Phone

 

                    Tawil, Elias A MD   Unavailable         [phone]

 

                    Jocy Loomis MD Unavailable         [phone]

 

                    Husam Dailey MD    Unavailable         [phone]

 

                    Self, Referral      PCP                 Unavailable

 

                    Jocy Loomis MD Unavailable         [phone]







Encounter Details





                          Care Team                 Description



                     Date                Type                Department  

 

                                        



Jocy Loomis MD



[address] [facility]



[place] 



[phone] (Fax)                      Prostate cancer (HCC) (Primary Dx); 

Malignant neoplasm of right kidney (HCC)



                     2021          Orders Only         Oncology: [facility] [address]  



                                         [place], [place]   



                                         [phone]  







Social History





                                        Date



                 Tobacco Use     Types           Packs/Day       Years Used 

 

                                         



                 Current Every Day Smoker  Cigarettes      1.5             30 

 

    



                     Smokeless Tobacco: Former  Snuff               Quit: 



                                         User   







                    Drinks/Week         oz/Week             Comments



                                         Alcohol Use   

 

                                        



6 Cans of beer            6.0                        



                                         Yes   







 



                           Sex Assigned at Birth     Date Recorded

 

 



                                         Not on file 







                                        Date Recorded



                           COVID-19 Exposure         Response 

 

                                        2021  8:46 AM CDT



                           In the last month, have you been in contact with  No 

/ Unsure 



                                         someone who was confirmed or suspected 

to have  



                                         Coronavirus / COVID-19?  



documented as of this encounter



Functional Status





                                        Date of Assessment



                           Functional Status         Response 

 

                                        2020



                           Does the patient have a hearing impairment:  No 

 

                                        2020



                           Does the patient have a visual impairment:  No 

 

                                        2020



                           Does the patient have impaired ambulation:  Yes 

 

                                        2020



                           Does the patient have an activity of daily living  No

 



                                         (ADL) impairment:  

 

                                        2020



                           Does the patient have an instrumental activity of  No

 



                                         daily living (IADL) impairment:  







                                        Date of Assessment



                           Cognitive Status          Response 

 

                                        2020



                           Does the patient have a cognitive impairment:  No 



documented as of this encounter



Plan of Treatment





                          Care Team                 Description



                     Date                Type                Specialty  

 

                                        



Jocy Loomis MD



[address] [facility]



[place] [address]



[phone] (Fax)                       



                     2021          Hospital            Oncology  



                                         Encounter   



documented as of this encounter



Results

* MAGNESIUM (2021  2:40 PM CDT)



    



              Component    Value        Ref Range    Performed At  Pathologist



                                         Signature

 

    



                 Magnesium       1.8             1.6 - 2.6 mg/dL  [facility] 











                                         Specimen

 





                                         Blood







   



                 Performing Organization  Address         City/[facility]/ZIP Code  P

titus Number

 

   



                     [facility]            [address] [place] [address] 





* PHOSPHORUS (2021  2:40 PM CDT)



    



              Component    Value        Ref Range    Performed At  Pathologist



                                         Signature

 

    



                 Phosphorus      3.8             2.0 - 4.5 MG/DL  [facility] 











                                         Specimen

 





                                         Blood







   



                 Performing Organization  Address         City/[facility]/ZIP Code  P

titus Number

 

   



                     [facility]            [address] [place] [address] 





* CBC AND DIFF (2021)



    



              Component    Value        Ref Range    Performed At  Pathologist



                                         Signature

 

    



                           White Blood               [facility] 



                                         Cells    

 

    



                           RBC                       [facility] 

 

    



                           Hemoglobin                [facility] 

 

    



                           Hematocrit                [facility] 

 

    



                           MCV                       [facility] 

 

    



                           MCH                       [facility] 

 

    



                           MCHC                      [facility] 

 

    



                           Platelet Count            [facility] 

 

    



                           MPV                       [facility] 

 

    



                           RDW                       [facility] 

 

    



                           Neutrophils               [facility] 

 

    



                           Absolute                  [facility] 



                                         Neutrophil    



                                         Count    

 

    



                           Lymphocytes               [facility] 

 

    



                           Absolute Lymph            [facility] 



                                         Count    

 

    



                           Monocytes                 [facility] 

 

    



                           Absolute                  [facility] 



                                         Monocyte Count    

 

    



                           Eosinophil                [facility] 

 

    



                           Absolute                  [facility] 



                                         Eosinophil    



                                         Count    

 

    



                           Basophils                 [facility] 

 

    



                           Absolute                  [facility] 



                                         Basophil Count    

 

    



                           Atypical Lym              [facility] 

 

    



                           Metamyelocyte             [facility] 

 

    



                           Myelocyte                 [facility] 

 

    



                           Promyelocyte              [facility] 

 

    



                           Blast                     [facility] 

 

    



                           RBC Morph                 [facility] 

 

    



                           WBC Morphology            [facility] 











                                         Specimen

 





                                         Blood - Blood







 



                           Narrative                 Performed At

 

 



                                         This result has an attachment that is n

ot available. 







   



                 Performing Organization  Address         City/[facility]/ZIP Code  P

titus Number

 

   



                     [facility]            [address] [place] [address] 





documented in this encounter



Visit Diagnoses









                                         Diagnosis

 





                                         Prostate cancer (HCC) - Primary



                                         Malignant neoplasm of prostate

 





                                         Malignant neoplasm of right kidney (HCC

)



documented in this encounter



Additional Health Concerns





 



                           Assessment                Noted Time

 

 



                           A fall risk assessment has been completed for the pat

ient  2021  9:56 AM 

CDT

 

 



                           PHQ-2 Depression Total Score: 2  2020 10:40 AM 

CST



documented as of this encounter

## 2021-08-09 NOTE — XMS REPORT
Encounter Summary

                             Created on: 2021



Rock NOLAN Chavez

External Reference #: [id number]

: 1955

Sex: Male



Demographics





                          Address                   [address]  [address]

 

                          Home Phone                [phone]

 

                          Preferred Language        English

 

                          Marital Status            

 

                          Jewish Affiliation     1073

 

                          Race                      White

 

                          Ethnic Group              Not  or 





Author





                          Author                    [facility]

 

                          Organization              [facility]

 

                          Address                   Unknown

 

                          Phone                     Unavailable







Support





                Name            Relationship    Address         Phone

 

                Ashlyn MCKEON            Unknown         [phone]







Care Team Providers





                    Care Team Member Name Role                Phone

 

                    Tawil, Elias A MD   Unavailable         [phone]

 

                    Jocy Loomis MD Unavailable         [phone]

 

                    Husam Dailey MD    Unavailable         [phone]

 

                    Self, Referral      PCP                 Unavailable

 

                    Jocy Loomis MD Unavailable         [phone]







Encounter Details





                          Care Team                 Description



                     Date                Type                Department  

 

                                        



Krystina Hidalgo PA-C



[address] [facility]



[place] [address]



[phone] (Fax)                       



                     2021          Documentation       Oncology: [facility]  



                                         [address]  



                                         [place] [address]  



                                         [phone]  







Social History





                                        Date



                 Tobacco Use     Types           Packs/Day       Years Used 

 

                                         



                 Current Every Day Smoker  Cigarettes      1.5             30 

 

    



                     Smokeless Tobacco: Former  Snuff               Quit: 1985



                                         User   







                    Drinks/Week         oz/Week             Comments



                                         Alcohol Use   

 

                                        



6 Cans of beer            6.0                        



                                         Yes   







 



                           Sex Assigned at Birth     Date Recorded

 

 



                                         Not on file 







                                        Date Recorded



                           COVID-19 Exposure         Response 

 

                                        2021  8:46 AM CDT



                           In the last month, have you been in contact with  No 

/ Unsure 



                                         someone who was confirmed or suspected 

to have  



                                         Coronavirus / COVID-19?  



documented as of this encounter



Functional Status





                                        Date of Assessment



                           Functional Status         Response 

 

                                        2020



                           Does the patient have a hearing impairment:  No 

 

                                        2020



                           Does the patient have a visual impairment:  No 

 

                                        2020



                           Does the patient have impaired ambulation:  Yes 

 

                                        2020



                           Does the patient have an activity of daily living  No

 



                                         (ADL) impairment:  

 

                                        2020



                           Does the patient have an instrumental activity of  No

 



                                         daily living (IADL) impairment:  







                                        Date of Assessment



                           Cognitive Status          Response 

 

                                        2020



                           Does the patient have a cognitive impairment:  No 



documented as of this encounter



Progress Notes

* Guerda Christina, RN - 2021  3:23 PM CDT



Formatting of this note might be different from the original.

Faxed orders for CT CAP, MRI head and labs to [facility] at [phone]
[phone].



Electronically signed by Guerda Christina RN at 2021  3:24 PM CDT

documented in this encounter



Plan of Treatment





                          Care Team                 Description



                     Date                Type                Specialty  

 

                                        



AnaJocy Bai MD



[address] [place] [address] [phone] (Fax)                       



                     2021          Hospital            Oncology  



                                         Encounter   



documented as of this encounter



Visit Diagnoses

Not on filedocumented in this encounter



Additional Health Concerns





 



                           Assessment                Noted Time

 

 



                           A fall risk assessment has been completed for the pat

ient  2021  2:34 PM 

CDT

 

 



                           PHQ-2 Depression Total Score: 2  2020 10:40 AM 

CST



documented as of this encounter

## 2021-10-15 ENCOUNTER — HOSPITAL ENCOUNTER (OUTPATIENT)
Dept: HOSPITAL 75 - CARD | Age: 66
End: 2021-10-15
Attending: INTERNAL MEDICINE
Payer: MEDICARE

## 2021-10-15 DIAGNOSIS — C64.1: ICD-10-CM

## 2021-10-15 DIAGNOSIS — C61: Primary | ICD-10-CM

## 2021-10-15 DIAGNOSIS — I26.99: ICD-10-CM

## 2021-10-15 DIAGNOSIS — N28.89: ICD-10-CM

## 2021-10-15 DIAGNOSIS — M89.9: ICD-10-CM

## 2021-10-15 LAB
BUN/CREAT SERPL: 15
CREAT SERPL-MCNC: 0.89 MG/DL (ref 0.6–1.3)
GFR SERPLBLD BASED ON 1.73 SQ M-ARVRAT: 85 ML/MIN

## 2021-10-15 PROCEDURE — 84520 ASSAY OF UREA NITROGEN: CPT

## 2021-10-15 PROCEDURE — 78306 BONE IMAGING WHOLE BODY: CPT

## 2021-10-15 PROCEDURE — 82565 ASSAY OF CREATININE: CPT

## 2021-10-15 PROCEDURE — 74177 CT ABD & PELVIS W/CONTRAST: CPT

## 2021-10-15 PROCEDURE — 36415 COLL VENOUS BLD VENIPUNCTURE: CPT

## 2021-10-15 PROCEDURE — 71260 CT THORAX DX C+: CPT

## 2021-10-15 NOTE — DIAGNOSTIC IMAGING REPORT
INDICATION: Renal and prostate carcinoma.



TECHNIQUE: The patient was administered 25.8 mCi technetium 99m

MDP intravenously and whole-body imaging was performed after a 3

hour delay.



CORRELATION is made with prior bone scan from 02/01/2021.



Uptake of activity by the axial and appendicular skeleton is

noted. There is uptake by the kidneys with excretion into the

urinary bladder. Uptake at the level of the left acetabulum is

similar to prior study. Uptake in the right iliac crest also

appears similar to prior exam. There is some uptake involving the

lateral aspect of the left 4th rib, similar to prior study. The

uptake involving the medial aspect of the right scapula is

similar. Uptake in the upper thoracic spine is similar to prior

study. No new foci are identified.



IMPRESSION: 



Stable metastatic lesions when compared with exam from

02/01/2021.



Dictated by: 



  Dictated on workstation # [id number] Patient reports dizziness since yesterday and feeling disorientated. Patient hypertensive.

## 2021-10-15 NOTE — DIAGNOSTIC IMAGING REPORT
PROCEDURE: CT chest, abdomen, and pelvis with contrast.



TECHNIQUE: Multiple contiguous axial images were obtained through

the chest, abdomen, and pelvis after the administration of

intravenous contrast. Auto Exposure Controls were utilized during

the CT exam to meet ALARA standards for radiation dose reduction.





INDICATION: Renal cancer and prostate cancer with metastases.



COMPARISON is made with prior CT from 8/8/2021 and 7/20/2021.



CT CHEST:



No axillary lymphadenopathy is identified. No mediastinal or

hilar lymphadenopathy is detected. There is no pericardial or

pleural fluid. The study was not protocoled for peak

opacification of the pulmonary arterial system, therefore

assessment of known emboli on prior exam is difficult although

there appears to be minimal residual filling defects in the right

main pulmonary artery at the right hilum. In general however the

clot burden appears to be significantly reduced. The patchy

groundglass infiltrates in the left upper lobe have significantly

improved. In addition, patchy groundglass opacities in the right

upper lobe appear improved. No new parenchymal abnormality is 

identified. Expansile lytic and destructive lesion of the medial

aspect of the right clavicle is again noted. Expansile lesion of

the left 4th rib is again noted.



CT ABDOMEN PELVIS:



There is mild generalized low density throughout the liver

suggestive of hepatic steatosis. No discrete liver mass is

identified. The gallbladder is unremarkable. There is no biliary

ductal dilatation. The pancreas and spleen are unremarkable. No

adrenal mass is identified. Left kidney is unremarkable. The

complex mixed solid and cystic mass involving the upper pole of

the right kidney is again noted. This extends exophytically off

the right kidney and it measures slightly smaller on today's

study at approximately 4.4 x 4.5 cm compared with 4.8 x 4.5 cm

when measured by similar technique on prior study. No central,

retroperitoneal or mesenteric lymphadenopathy is seen. Aorta is

nonaneurysmal. Bowel loops are of normal caliber. No definite

inguinal or iliac lymphadenopathy is seen. There is no ascites.

The expansile lytic lesion involving the right iliac crest is

again noted. Lucency in the supra-acetabular location on the left

is also similar to prior exam. No definite new lytic lesion is

identified.



IMPRESSION:



1. No evidence of thoracic lymphadenopathy or pulmonary

metastatic disease. The ground glass infiltrates in the bilateral

upper lobes have improved.

2. Improving pulmonary emboli. 

3. Expansile lesions of the right scapula and left 4th rib,

stable. 

4. No evidence of abdominal or pelvic lymphadenopathy. Solid

right renal mass does appear to be slightly smaller in size. 

5. Expansile lytic lesions of the right iliac bone left

supraacetabular region appears stable.



Dictated by: 



  Dictated on workstation # [id number]

## 2022-02-28 ENCOUNTER — HOSPITAL ENCOUNTER (OUTPATIENT)
Dept: HOSPITAL 75 - ONC | Age: 67
Discharge: HOME | End: 2022-02-28
Attending: RADIOLOGY
Payer: MEDICARE

## 2022-02-28 DIAGNOSIS — I10: ICD-10-CM

## 2022-02-28 DIAGNOSIS — C64.9: ICD-10-CM

## 2022-02-28 DIAGNOSIS — C79.51: ICD-10-CM

## 2022-02-28 DIAGNOSIS — Z51.0: Primary | ICD-10-CM

## 2022-02-28 DIAGNOSIS — E78.5: ICD-10-CM

## 2022-02-28 DIAGNOSIS — I25.10: ICD-10-CM

## 2022-02-28 PROCEDURE — 77417 THER RADIOLOGY PORT IMAGE(S): CPT

## 2022-02-28 PROCEDURE — 77470 SPECIAL RADIATION TREATMENT: CPT

## 2022-02-28 PROCEDURE — 77334 RADIATION TREATMENT AID(S): CPT

## 2022-02-28 PROCEDURE — 99214 OFFICE O/P EST MOD 30 MIN: CPT

## 2022-02-28 PROCEDURE — 77295 3-D RADIOTHERAPY PLAN: CPT

## 2022-02-28 PROCEDURE — 77290 THER RAD SIMULAJ FIELD CPLX: CPT

## 2022-02-28 PROCEDURE — 77300 RADIATION THERAPY DOSE PLAN: CPT

## 2022-03-10 ENCOUNTER — HOSPITAL ENCOUNTER (OUTPATIENT)
Dept: HOSPITAL 75 - ONC | Age: 67
LOS: 21 days | Discharge: HOME | End: 2022-03-31
Attending: RADIOLOGY
Payer: MEDICARE

## 2022-03-10 DIAGNOSIS — I25.10: ICD-10-CM

## 2022-03-10 DIAGNOSIS — Z51.0: Primary | ICD-10-CM

## 2022-03-10 DIAGNOSIS — C64.9: ICD-10-CM

## 2022-03-10 DIAGNOSIS — C79.51: ICD-10-CM

## 2022-03-10 DIAGNOSIS — E78.5: ICD-10-CM

## 2022-03-10 DIAGNOSIS — I10: ICD-10-CM

## 2022-03-10 PROCEDURE — 77412 RADIATION TX DELIVERY LVL 3: CPT

## 2022-03-10 PROCEDURE — 77417 THER RADIOLOGY PORT IMAGE(S): CPT

## 2022-03-10 PROCEDURE — 77336 RADIATION PHYSICS CONSULT: CPT

## 2022-08-02 ENCOUNTER — HOSPITAL ENCOUNTER (OUTPATIENT)
Dept: HOSPITAL 75 - CARD | Age: 67
End: 2022-08-02
Attending: INTERNAL MEDICINE
Payer: MEDICARE

## 2022-08-02 DIAGNOSIS — C64.1: Primary | ICD-10-CM

## 2022-08-02 DIAGNOSIS — C61: ICD-10-CM

## 2022-08-02 LAB
ALBUMIN SERPL-MCNC: 3.9 GM/DL (ref 3.2–4.5)
ALP SERPL-CCNC: 87 U/L (ref 40–136)
ALT SERPL-CCNC: 11 U/L (ref 0–55)
BILIRUB SERPL-MCNC: 0.3 MG/DL (ref 0.1–1)
BUN/CREAT SERPL: 13
CALCIUM SERPL-MCNC: 9.3 MG/DL (ref 8.5–10.1)
CHLORIDE SERPL-SCNC: 98 MMOL/L (ref 98–107)
CO2 SERPL-SCNC: 29 MMOL/L (ref 21–32)
CREAT SERPL-MCNC: 0.88 MG/DL (ref 0.6–1.3)
GFR SERPLBLD BASED ON 1.73 SQ M-ARVRAT: 94 ML/MIN
GLUCOSE SERPL-MCNC: 108 MG/DL (ref 70–105)
POTASSIUM SERPL-SCNC: 4.1 MMOL/L (ref 3.6–5)
PROT SERPL-MCNC: 6.7 GM/DL (ref 6.4–8.2)
SODIUM SERPL-SCNC: 136 MMOL/L (ref 135–145)

## 2022-08-02 PROCEDURE — 71260 CT THORAX DX C+: CPT

## 2022-08-02 PROCEDURE — 78306 BONE IMAGING WHOLE BODY: CPT

## 2022-08-02 PROCEDURE — 36415 COLL VENOUS BLD VENIPUNCTURE: CPT

## 2022-08-02 PROCEDURE — 74178 CT ABD&PLV WO CNTR FLWD CNTR: CPT

## 2022-08-02 PROCEDURE — 80053 COMPREHEN METABOLIC PANEL: CPT

## 2022-08-02 PROCEDURE — 84403 ASSAY OF TOTAL TESTOSTERONE: CPT

## 2022-08-02 PROCEDURE — 84153 ASSAY OF PSA TOTAL: CPT

## 2022-08-02 RX ADMIN — Medication PRN ML: at 11:31

## 2022-08-02 RX ADMIN — Medication PRN ML: at 12:33

## 2022-08-02 NOTE — DIAGNOSTIC IMAGING REPORT
PROCEDURE: CT of the chest and pelvis with contrast and CT of the

abdomen with and without contrast.



TECHNIQUE: Precontrast acquisitions were acquired through the

abdomen. Multiple contiguous axial images were obtained through

the chest, abdomen and pelvis after administration of intravenous

contrast. Auto Exposure Controls were utilized during the CT exam

to meet ALARA standards for radiation dose reduction. 



INDICATION:  Neoplasm of the right kidney.



COMPARISON: CT chest and pelvis of 10/15/2021.



FINDINGS: The target lesions from prior examination are as

follows:

1. Stable size of the exophytic mixed solid and cystic mass

arising from the upper pole of the right kidney. This measures

4.4 x 4.6 cm (previously 4.4 x 4.5 cm).

2. Stable expansile lytic lesion in the lateral left 4th rib

measuring 3.4 x 2.2 cm (previously 3.4 x 2.2 cm when measured

similarly).

3. The destructive expansile lesion in the body of the right

scapula is stable measuring 3.5 x 4.9 cm (previously 3.5 x 4.8

cm).

4. Expansile lytic lesion in the right ilium measures 6.7 x 4.1

cm (previously 7.0 x 4.0 cm).



No abnormality in the trachea. No pneumonia or edema. No

suspicious pulmonary nodules have developed. No pleural effusion

or pneumothorax. No supraclavicular or axillary lymphadenopathy.

No mediastinal or hilar lymphadenopathy. Heart is normal in size.

Normal caliber thoracic aorta. Multiple small nodules within the

thyroid are stable.



The liver, gallbladder and spleen are negative for metastases.

Pancreas is normal. No adrenal mass. Left kidney is normal. No

obstructive uropathy on either side. Urinary bladder is normally

filled without wall thickening. The prostate is not enlarged. No

abdominal or pelvic lymphadenopathy is developed. No bowel

obstruction or colonic wall thickening. No new concerning osseous

lesion.



IMPRESSION:

1. Stable size primary tumor of the right kidney.

2. No change in the multifocal skeletal metastases.

3. No new metastatic disease within the chest, abdomen or pelvis.



Dictated by: 



  Dictated on workstation # [id number]

## 2022-08-02 NOTE — DIAGNOSTIC IMAGING REPORT
Indication: Right renal malignancy, new onset back pain.



Compared with bone scan 10/15/2021.



Patient received 26.3 mCi intravenous dose technetium 99 MDP,

after 3 hours whole-body planar imaging was performed.



Findings:  There has been the development of multiple foci of

abnormal vertebral body and posterior element uptake throughout

the thoracolumbar spine as well as multiple bilateral ribs and

progressive abnormalities to the right scapula.  New lesions

involve the inter and subtrochanteric left femur as well as the

left innominate bone at the supra-acetabular region as well as in

the mid shaft of the left greater than right femur and lesion in

the proximal right anterior tibia. Lesions involve the bilateral

proximal and mid shafts of the humerus as well as the right

greater than left glenoid and distal right clavicle. 



Impression:  Adverse change findings are very suggestive of

progressive widespread bony metastatic disease.



Dictated by: 



  Dictated on workstation # [id number]

## 2022-08-09 ENCOUNTER — HOSPITAL ENCOUNTER (OUTPATIENT)
Dept: HOSPITAL 75 - RAD | Age: 67
End: 2022-08-09
Attending: INTERNAL MEDICINE
Payer: MEDICARE

## 2022-08-09 DIAGNOSIS — M47.817: ICD-10-CM

## 2022-08-09 DIAGNOSIS — C64.1: Primary | ICD-10-CM

## 2022-08-09 DIAGNOSIS — C79.51: ICD-10-CM

## 2022-08-09 PROCEDURE — 72158 MRI LUMBAR SPINE W/O & W/DYE: CPT

## 2022-08-09 NOTE — DIAGNOSTIC IMAGING REPORT
PROCEDURE: MRI lumbar spine with and without contrast.



TECHNIQUE:  Multiplanar, multisequence MRI of the lumbar spine

was performed with and without contrast.



DATE: August 9, 2022.



COMPARISON: CT chest abdomen pelvis August 2, 2022. CT chest and

pelvis October 15, 2021. 

INDICATION: 67-year-old male, history of renal and prostate

malignancy. Evaluation for bone metastasis.



FINDINGS: 

There are multiple T1 hypointense, T2 hyperintense, enhancing

lesions present. This includes several lesions in the T12

vertebral body with the largest lesion near the superior endplate

measuring up to 8 mm in size on sagittal postcontrast image 14.

There are also lesions involving the posterior elements at T12.

There is a larger lesion of the L1 vertebral body measuring

approximately 3.4 x 2.4 cm in size. There is an additional lesion

involving the right pedicle on axial T1 sequence image 15. There

are 2 adjacent lesions of the L2 vertebral body well demonstrated

on sagittal T1 sequence image 9 as well as additional separate

lesion on sagittal T1 sequence image 15. There is also a lesion

of the right pedicle on sagittal T1 sequence image 15. The T1

hyperintense lesion in the L3 vertebral body on sagittal T1

sequence image 12 is compatible with a hemangioma; however, the

T1 hypointense lesion anteriorly enhances and is consistent with

a bone metastasis. There is also a bone lesion of the L4

vertebral body extending to the superior endplate as well as

additional lesion on sagittal image 14. There is also a large

lesion of the L5 vertebral body. There are also lesions involving

the spinous processes of L3, L4, and L5. There is a lesion of the

spinous process of T12 on axial T1 sequence image 6. There are

also enhancing bone lesions of the sacrum and iliac bones

bilaterally. One of the larger right iliac bone lesions is

expansile and can be seen on axial T1 sequence image 51 measuring

at least 5.1 cm in size. There is no identified compression

deformity or other fracture.



The visualized portions of the spinal cord are unremarkable in

signal. The conus medullaris terminates at the level of L1-L2.

The disc heights are fairly well preserved.



L1-L2: There is mild diffuse disc bulge. The facet joints and

ligamentum flavum are unremarkable. There is no foraminal

narrowing. There is no spinal canal stenosis.



L2-L3: There is no disc bulge. The facet joints and ligamentum

flavum are unremarkable. There is no foraminal narrowing. There

is no spinal canal stenosis.



L3-L4: There is mild diffuse disc bulge. There is mild to

moderate bilateral lateral recess narrowing. The facet joints and

ligamentum flavum are unremarkable. There is mild bilateral

foraminal narrowing. There is mild spinal canal stenosis.



L4-L5: There is diffuse disc bulge. There is moderate narrowing

of bilateral lateral recesses. The facet joints and ligamentum

flavum are unremarkable. There is moderate bilateral foraminal

narrowing. There is mild spinal canal stenosis.



L5-S1: There is no disc bulge. There are right facet degenerative

changes. There is no foraminal narrowing. There is no spinal

canal stenosis.



There is a heterogeneous signal mass involving the right kidney

with internal enhancement measuring at least 4.7 x 3.9 cm in

axial extent.



IMPRESSION: 



1. Numerous bone metastasis involving the thoracic spine, lumbar

spine, sacrum, and bilateral iliac bones.

2. No identified pathologic fracture.

3. Heterogeneous signal right renal mass likely relating to

provided history of renal malignancy.

4. Degenerative changes of the lumbar spine as described level by

level above.

 



Dictated by: 



  Dictated on workstation # [id number]

## 2022-08-31 ENCOUNTER — HOSPITAL ENCOUNTER (OUTPATIENT)
Dept: HOSPITAL 75 - ONC | Age: 67
Discharge: HOME | End: 2022-08-31
Attending: RADIOLOGY
Payer: MEDICARE

## 2022-08-31 DIAGNOSIS — C79.51: ICD-10-CM

## 2022-08-31 DIAGNOSIS — Z51.0: Primary | ICD-10-CM

## 2022-08-31 DIAGNOSIS — C80.1: ICD-10-CM

## 2022-08-31 PROCEDURE — 77334 RADIATION TREATMENT AID(S): CPT

## 2022-08-31 PROCEDURE — 77417 THER RADIOLOGY PORT IMAGE(S): CPT

## 2022-08-31 PROCEDURE — 77300 RADIATION THERAPY DOSE PLAN: CPT

## 2022-08-31 PROCEDURE — 77290 THER RAD SIMULAJ FIELD CPLX: CPT

## 2022-08-31 PROCEDURE — 77280 THER RAD SIMULAJ FIELD SMPL: CPT

## 2022-08-31 PROCEDURE — 99213 OFFICE O/P EST LOW 20 MIN: CPT

## 2022-08-31 PROCEDURE — 77470 SPECIAL RADIATION TREATMENT: CPT

## 2022-08-31 PROCEDURE — 77295 3-D RADIOTHERAPY PLAN: CPT

## 2022-09-02 ENCOUNTER — HOSPITAL ENCOUNTER (OUTPATIENT)
Dept: HOSPITAL 75 - ONC | Age: 67
LOS: 28 days | Discharge: HOME | End: 2022-09-30
Attending: RADIOLOGY
Payer: MEDICARE

## 2022-09-02 DIAGNOSIS — C79.51: ICD-10-CM

## 2022-09-02 DIAGNOSIS — Z51.0: Primary | ICD-10-CM

## 2022-09-02 DIAGNOSIS — I10: ICD-10-CM

## 2022-09-02 DIAGNOSIS — E78.5: ICD-10-CM

## 2022-09-02 DIAGNOSIS — C64.9: ICD-10-CM

## 2022-09-02 PROCEDURE — 77336 RADIATION PHYSICS CONSULT: CPT

## 2022-09-13 ENCOUNTER — HOSPITAL ENCOUNTER (OUTPATIENT)
Dept: HOSPITAL 75 - ER | Age: 67
Setting detail: OBSERVATION
LOS: 1 days | Discharge: HOME | End: 2022-09-14
Attending: INTERNAL MEDICINE | Admitting: INTERNAL MEDICINE
Payer: MEDICARE

## 2022-09-13 VITALS — HEIGHT: 68.11 IN | BODY MASS INDEX: 26.3 KG/M2 | WEIGHT: 173.5 LBS

## 2022-09-13 VITALS — DIASTOLIC BLOOD PRESSURE: 73 MMHG | SYSTOLIC BLOOD PRESSURE: 117 MMHG

## 2022-09-13 VITALS — SYSTOLIC BLOOD PRESSURE: 114 MMHG | DIASTOLIC BLOOD PRESSURE: 56 MMHG

## 2022-09-13 DIAGNOSIS — C79.00: ICD-10-CM

## 2022-09-13 DIAGNOSIS — U07.1: Primary | ICD-10-CM

## 2022-09-13 DIAGNOSIS — R53.81: ICD-10-CM

## 2022-09-13 DIAGNOSIS — C61: ICD-10-CM

## 2022-09-13 DIAGNOSIS — F17.210: ICD-10-CM

## 2022-09-13 LAB
ALBUMIN SERPL-MCNC: 3.5 GM/DL (ref 3.2–4.5)
ALP SERPL-CCNC: 87 U/L (ref 40–136)
ALT SERPL-CCNC: 31 U/L (ref 0–55)
APTT BLD: 35 SEC (ref 24–35)
BASOPHILS # BLD AUTO: 0 10^3/UL (ref 0–0.1)
BASOPHILS NFR BLD AUTO: 0 % (ref 0–10)
BILIRUB SERPL-MCNC: 0.5 MG/DL (ref 0.1–1)
BUN/CREAT SERPL: 15
CALCIUM SERPL-MCNC: 8.8 MG/DL (ref 8.5–10.1)
CHLORIDE SERPL-SCNC: 96 MMOL/L (ref 98–107)
CO2 SERPL-SCNC: 24 MMOL/L (ref 21–32)
CREAT SERPL-MCNC: 1.64 MG/DL (ref 0.6–1.3)
EOSINOPHIL # BLD AUTO: 0 10^3/UL (ref 0–0.3)
EOSINOPHIL NFR BLD AUTO: 0 % (ref 0–10)
GFR SERPLBLD BASED ON 1.73 SQ M-ARVRAT: 45 ML/MIN
GLUCOSE SERPL-MCNC: 107 MG/DL (ref 70–105)
HCT VFR BLD CALC: 37 % (ref 40–54)
HGB BLD-MCNC: 12.2 G/DL (ref 13.3–17.7)
INR PPP: 1.1 (ref 0.8–1.4)
LYMPHOCYTES # BLD AUTO: 0.3 10^3/UL (ref 1–4)
LYMPHOCYTES NFR BLD AUTO: 5 % (ref 12–44)
MANUAL DIFFERENTIAL PERFORMED BLD QL: YES
MCH RBC QN AUTO: 30 PG (ref 25–34)
MCHC RBC AUTO-ENTMCNC: 33 G/DL (ref 32–36)
MCV RBC AUTO: 90 FL (ref 80–99)
MONOCYTES # BLD AUTO: 0.6 10^3/UL (ref 0–1)
MONOCYTES NFR BLD AUTO: 10 % (ref 0–12)
MONOCYTES NFR BLD: 11 %
NEUTROPHILS # BLD AUTO: 5.7 10^3/UL (ref 1.8–7.8)
NEUTROPHILS NFR BLD AUTO: 85 % (ref 42–75)
NEUTS BAND NFR BLD MANUAL: 86 %
PLATELET # BLD: 119 10^3/UL (ref 130–400)
PMV BLD AUTO: 10.3 FL (ref 9–12.2)
POTASSIUM SERPL-SCNC: 4.4 MMOL/L (ref 3.6–5)
PROT SERPL-MCNC: 6.5 GM/DL (ref 6.4–8.2)
PROTHROMBIN TIME: 14.5 SEC (ref 12.2–14.7)
RBC MORPH BLD: NORMAL
SODIUM SERPL-SCNC: 135 MMOL/L (ref 135–145)
VARIANT LYMPHS NFR BLD MANUAL: 3 %
WBC # BLD AUTO: 6.7 10^3/UL (ref 4.3–11)

## 2022-09-13 PROCEDURE — 84145 PROCALCITONIN (PCT): CPT

## 2022-09-13 PROCEDURE — 85025 COMPLETE CBC W/AUTO DIFF WBC: CPT

## 2022-09-13 PROCEDURE — 87636 SARSCOV2 & INF A&B AMP PRB: CPT

## 2022-09-13 PROCEDURE — 97161 PT EVAL LOW COMPLEX 20 MIN: CPT

## 2022-09-13 PROCEDURE — 85610 PROTHROMBIN TIME: CPT

## 2022-09-13 PROCEDURE — 85730 THROMBOPLASTIN TIME PARTIAL: CPT

## 2022-09-13 PROCEDURE — 85027 COMPLETE CBC AUTOMATED: CPT

## 2022-09-13 PROCEDURE — 93005 ELECTROCARDIOGRAM TRACING: CPT

## 2022-09-13 PROCEDURE — 36415 COLL VENOUS BLD VENIPUNCTURE: CPT

## 2022-09-13 PROCEDURE — 71045 X-RAY EXAM CHEST 1 VIEW: CPT

## 2022-09-13 PROCEDURE — 96372 THER/PROPH/DIAG INJ SC/IM: CPT

## 2022-09-13 PROCEDURE — 93041 RHYTHM ECG TRACING: CPT

## 2022-09-13 PROCEDURE — 87040 BLOOD CULTURE FOR BACTERIA: CPT

## 2022-09-13 PROCEDURE — 99284 EMERGENCY DEPT VISIT MOD MDM: CPT

## 2022-09-13 PROCEDURE — 85007 BL SMEAR W/DIFF WBC COUNT: CPT

## 2022-09-13 PROCEDURE — 83605 ASSAY OF LACTIC ACID: CPT

## 2022-09-13 PROCEDURE — 80053 COMPREHEN METABOLIC PANEL: CPT

## 2022-09-13 RX ADMIN — ENOXAPARIN SODIUM SCH MG: 100 INJECTION SUBCUTANEOUS at 22:42

## 2022-09-13 RX ADMIN — SODIUM CHLORIDE SCH MLS/HR: 900 INJECTION, SOLUTION INTRAVENOUS at 22:20

## 2022-09-13 NOTE — ED GENERAL
General


Stated Complaint:  ELEVATED PAIN,WEAKNESS


Source of Information:  Patient (PT IS A VERY POOR HISTORIAN WITH VERY POOR 

MEMORY AND VERY SLOW MENTATION), EMS





History of Present Illness


Date Seen by Provider:  Sep 13, 2022


Time Seen by Provider:  19:28


Initial Comments


PT ARRIVES VIA EMS FROM HOME--LIVES WITH WIFE, BUT NO FAMILY ARE HERE WITH PT. 


PT HAS METASTATIC PROSTATE CANCER AND HAS CHRONIC GENERALIZED PAIN, AND IS ON 

MORPHINE 


PT IS NOT ON HOSPICE


PT JUST COMPLETED RADIATION--DONE HERE, AND IS STILL GETTING CHEMO--DONE AT 


PT HAS HAD INCREASED GENERALIZED PAIN TODAY, ESPECIALLY IN GROIN AREA


REPORTED THAT PAIN WAS 10/10 AT HOME


C/O GENERALIZED WEAKNESS--TOO WEAK TO STAND





TEMP .4 FOR EMS--PT UNAWARE OF FEVER


 SYSTOLIC FOR EMS


O2 SAT 92% ON ROOM AIR FOR EMS, PLACE ON O2 AT 2L/NC--PT DOES NOT HAVE HOME O2. 


PT NOTED TO HAVE A COUGH ON EXAM. PT DOES NOT KNOW HOW LONG HE HAS HAD A COUGH


DENIES FEELING SHORT OF BREATH


DENIES CHEST PAIN





EMS GAVE FENTANYL 50 MCG PRIOR TO ARRIVAL AND STARTED IV FLUIDS


RATES PAIN 7/10 ON ARRIVAL





PT WAS RECENTLY EXPOSED TO COVID





PT STATES HE HAS HAD A COVID VACCINE, BUT DOES NOT KNOW HOW MANY HE HAS HAD OR 

WHEN HE HAD THEM





GOES TO  FOR ALL MEDICAL CARE





Allergies and Home Medications


Allergies


Coded Allergies:  


     No Known Drug Allergies (Unverified , 12/18/11)





Patient Home Medication List


Home Medication List Reviewed:  Yes


Acetaminophen (Tylenol) 325 Mg Tablet, 650 MG PO Q6H PRN, (Reported)


   Entered as Reported by: DAVID RENDON on 12/20/11 0934


Apixaban (Eliquis) 5 Mg Tablet, 5 MG PO BID


   Prescribed by: JEOVANNY TAMAYO on 8/8/21 1820


Aspirin (Aspirin Ec 81 Mg) 81 Mg Tabec, 81 MG PO DAILY, (Reported)


   Entered as Reported by: DAVID RENDON on 12/20/11 0934


Clopidogrel (Plavix) 75 Mg Tablet, 75 MG PO DAILY, (Reported)


   Entered as Reported by: DAVID RENDON on 12/20/11 0934


Lisinopril (Zestril) 5 Mg Tablet, 5 MG PO HS, (Reported)


   Entered as Reported by: DAVID RENDON on 12/20/11 0934


Metoprolol Succinate (Toprol Xl 25MG) 25 Mg Tab.sr.24h, 25 MG PO DAILY, 

(Reported)


   Entered as Reported by: DAVID RENDON on 12/20/11 0934


Phenylephrine/Diphenhydramine (Benadryl-D Allergy-Sinus Ulttb) 1 Each Tablet, 1 

EACH PO PRN, (Reported)


   Entered as Reported by: DON SAWANT on 7/29/13 0742


Pravastatin Sodium (Pravachol) 20 Mg Tablet, 20 MG PO DAILY, (Reported)


   Entered as Reported by: DAVID RENDON on 12/20/11 0934





Review of Systems


Review of Systems


Constitutional:  see HPI, fever, malaise, weakness


Respiratory:  cough; No short of breath


Cardiovascular:  No chest pain


Gastrointestinal:  see HPI


Musculoskeletal:  see HPI


Skin:  no symptoms reported


Psychiatric/Neurological:  No Symptoms Reported





Past Medical-Social-Family Hx


Seasonal Allergies


Seasonal Allergies:  No





Past Medical History


Surgeries:  Yes (neck)


Cardiac, Coronary Stent, Orthopedic


Respiratory:  Yes


Pulmonary Embolism


Cardiac:  Yes (stent placement 10 years ago)


Coronary Artery Disease, High Cholesterol, Hypertension


Neurological:  No


Reproductive Disorders:  No


Genitourinary:  Yes (METASTATIC PROSTATE CANCER)


Gastrointestinal:  No


Musculoskeletal:  Yes (CHRONIC GENERALIZED PAIN/ METASTATIC CANCER)


Endocrine:  No


HEENT:  No


Cancer:  Yes


Prostate, Bone, Kidney


Psychosocial:  No


Integumentary:  No


Blood Disorders:  No





Physical Exam


Vital Signs





Vital Signs - First Documented








 9/13/22





 19:29


 


Temp 37.9


 


Pulse 93


 


Resp 22


 


B/P (MAP) 131/75 (93)


 


Pulse Ox 95


 


O2 Delivery Room Air





Capillary Refill :


Height, Weight, BMI


Height: 5'9.00"


Weight: 190lbs. oz. 86.056177pk; 25.00 BMI


Method:Stated


General Appearance:  No Apparent Distress, WD/WN, Chronically ill, Other 

(SOMEWHAT LETHARGIC)


HEENT:  Other (ORAL MUCOSA DRY,, LIPS CAKED AND CRACKED. )


Respiratory:  No Accessory Muscle Use, No Respiratory Distress, Other (SPITTING 

UP CLEAR MUCOUS/SPUTUM)


Cardiovascular:  No Murmur, Tachycardia (MILD)


Gastrointestinal:  Non Tender, Soft, Tenderness (SUPRAPUBIC )


Extremity:  No Pedal Edema


Neurologic/Psychiatric:  Alert, Other (LETHARGIC,SLOW MENTATION AND POOR MEMORY.

NO FOCAL DEFICITS, AND MOVES ALL EXTREMITIES EQUALLY. SPEECH IS NOT SLURRED. NO 

FACIAL DROOP. PT WITH GENERALIZED WEAKNESS AND SOME DIFFICULTY FOLLOWING 

COMMANDS. )


Skin:  Normal Color, Warm/Dry





Focused Exam


Sepsis Stage:  Sepsis


Possible Source:  Other (LUNG AND/OR URINARY TRACT)


Lactate Level


9/13/22 19:30: Lactic Acid Level 1.84





Time of Focused Exam:  21:00


Respiratory:  Normal Breath Sounds, No Accessory Muscle Use, No Respiratory 

Distress


Cardiovascular:  Regular Rate, Rhythm, Normal Peripheral Pulses


Capillary Refill:  Less Than 3 Seconds


Skin:  normal color, warm/dry


Lactic Acid Level





Laboratory Tests








Test


 9/13/22


19:30


 


Lactic Acid Level


 1.84 MMOL/L


(0.50-2.00)








Within 3hrs of presentation:  Admin fluids, Admin ABX, Blood cultures prior to 

ABX's, Focus exam, Lactate level





Progress/Results/Core Measures


Suspected Sepsis


SIRS


Temperature: 


Pulse:  


Respiratory Rate: 


 


Laboratory Tests


9/13/22 19:30: White Blood Count 6.7


Blood Pressure  / 


Mean: 


 





9/13/22 19:30: Lactic Acid Level 1.84


Laboratory Tests


9/13/22 19:30: 


Creatinine 1.64H, INR Comment 1.1, Platelet Count 119L, Total Bilirubin 0.5








Results/Orders


Lab Results





Laboratory Tests








Test


 9/13/22


19:30 9/13/22


19:32 Range/Units


 


 


White Blood Count


 6.7 


 


 4.3-11.0


10^3/uL


 


Red Blood Count


 4.11 L


 


 4.30-5.52


10^6/uL


 


Hemoglobin 12.2 L  13.3-17.7  g/dL


 


Hematocrit 37 L  40-54  %


 


Mean Corpuscular Volume 90   80-99  fL


 


Mean Corpuscular Hemoglobin 30   25-34  pg


 


Mean Corpuscular Hemoglobin


Concent 33 


 


 32-36  g/dL





 


Red Cell Distribution Width 14.6 H  10.0-14.5  %


 


Platelet Count


 119 L


 


 130-400


10^3/uL


 


Mean Platelet Volume 10.3   9.0-12.2  fL


 


Immature Granulocyte % (Auto) 0    %


 


Neutrophils (%) (Auto) 85 H  42-75  %


 


Lymphocytes (%) (Auto) 5 L  12-44  %


 


Monocytes (%) (Auto) 10   0-12  %


 


Eosinophils (%) (Auto) 0   0-10  %


 


Basophils (%) (Auto) 0   0-10  %


 


Neutrophils # (Auto)


 5.7 


 


 1.8-7.8


10^3/uL


 


Lymphocytes # (Auto)


 0.3 L


 


 1.0-4.0


10^3/uL


 


Monocytes # (Auto)


 0.6 


 


 0.0-1.0


10^3/uL


 


Eosinophils # (Auto)


 0.0 


 


 0.0-0.3


10^3/uL


 


Basophils # (Auto)


 0.0 


 


 0.0-0.1


10^3/uL


 


Immature Granulocyte # (Auto)


 0.0 


 


 0.0-0.1


10^3/uL


 


Neutrophils % (Manual) 86    %


 


Lymphocytes % (Manual) 3    %


 


Monocytes % (Manual) 11    %


 


Percent Immature Platelet


Fraction 4.2 


 


 0.0-7.6  %





 


Blood Morphology Comment NORMAL    


 


Prothrombin Time 14.5   12.2-14.7  SEC


 


INR Comment 1.1   0.8-1.4  


 


Activated Partial


Thromboplast Time 35 


 


 24-35  SEC





 


Sodium Level 135   135-145  MMOL/L


 


Potassium Level 4.4   3.6-5.0  MMOL/L


 


Chloride Level 96 L    MMOL/L


 


Carbon Dioxide Level 24   21-32  MMOL/L


 


Anion Gap 15 H  5-14  MMOL/L


 


Blood Urea Nitrogen 25 H  7-18  MG/DL


 


Creatinine


 1.64 H


 


 0.60-1.30


MG/DL


 


Estimat Glomerular Filtration


Rate 45 


 


  





 


BUN/Creatinine Ratio 15    


 


Glucose Level 107 H    MG/DL


 


Lactic Acid Level


 1.84 


 


 0.50-2.00


MMOL/L


 


Calcium Level 8.8   8.5-10.1  MG/DL


 


Corrected Calcium 9.2   8.5-10.1  MG/DL


 


Total Bilirubin 0.5   0.1-1.0  MG/DL


 


Aspartate Amino Transf


(AST/SGOT) 65 H


 


 5-34  U/L





 


Alanine Aminotransferase


(ALT/SGPT) 31 


 


 0-55  U/L





 


Alkaline Phosphatase 87     U/L


 


Total Protein 6.5   6.4-8.2  GM/DL


 


Albumin 3.5   3.2-4.5  GM/DL


 


Procalcitonin 0.14 H  <0.10  NG/ML


 


Influenza Type A (RT-PCR)  Not Detected  Not Detecte  


 


Influenza Type B (RT-PCR)  Not Detected  Not Detecte  


 


SARS-CoV-2 RNA (RT-PCR)  Detected H Not Detecte  








My Orders





Orders - DANNA DELGADILLO DO


Ed Iv/Invasive Line Start (9/13/22 19:30)


Ekg Tracing (9/13/22 19:30)


O2 (9/13/22 19:30)


Monitor-Rhythm Ecg Trace Only (9/13/22 19:30)


Cbc With Automated Diff (9/13/22 19:30)


Comprehensive Metabolic Panel (9/13/22 19:30)


Procalcitonin (Pct) (9/13/22 19:30)


Blood Culture (9/13/22 19:30)


Ekg Tracing (9/13/22 19:30)


Covid 19 Inhouse Test (9/13/22 19:30)


Urinalysis (9/13/22 19:30)


Urine Culture (9/13/22 19:30)


Protime With Inr (9/13/22 19:30)


Partial Thromboplastin Time (9/13/22 19:30)


Chest 1 View, Ap/Pa Only (9/13/22 19:30)


Acetaminophen  Tablet (Tylenol  Tablet) (9/13/22 19:30)


Ed Iv/Invasive Line Start (9/13/22 19:30)


Ed Iv/Invasive Line Start (9/13/22 19:30)


Vital Signs Adult Sepsis Patie Q15M (9/13/22 19:30)


O2 (9/13/22 19:30)


Remove Rings In Anticipation O (9/13/22 19:30)


Lactic Acid Analyzer (9/13/22 19:30)


Ns Iv 1000 Ml (Sodium Chloride 0.9%) (9/13/22 19:30)


Cefepime Injection (Maxipime Injection) (9/13/22 19:30)


Influenza A And B By Pcr (9/13/22 19:30)


Isolation Central Supply Req (9/13/22 19:30)


Manual Differential (9/13/22 19:30)


Ed Admission (Communication) (9/13/22 21:26)





Medications Given in ED





Current Medications








 Medications  Dose


 Ordered  Sig/Rosalba


 Route  Start Time


 Stop Time Status Last Admin


Dose Admin


 


 Acetaminophen  1,000 mg  ONCE  PRN


 PO  9/13/22 19:30


 9/13/22 20:08 DC 9/13/22 20:07


1,000 MG


 


 Cefepime HCl 1000


 mg/Sodium Chloride  50 ml @ 


 100 mls/hr  ONCE  ONCE


 IV  9/13/22 19:30


 9/13/22 19:59 DC 9/13/22 20:08


100 MLS/HR








Vital Signs/I&O











 9/13/22 9/13/22





 19:29 19:29


 


Temp  37.9


 


Pulse  93


 


Resp  22


 


B/P (MAP)  131/75 (93)


 


Pulse Ox  95


 


O2 Delivery Room Air Room Air














 9/14/22





 00:00


 


Intake Total 1550 ml


 


Balance 1550 ml





Capillary Refill :


Progress Note :  


Progress Note


PLACED IN ISOLATION ROOM


PPE WORN


COVID AND FLU TESTING DONE


SEPSIS PROTOCOL INITIATED





GIVEN:


-IV FLUIDS


-TYLENOL


-CEFEPIME





NO DETERIORATION IN PT'S CONDITION DURING ER STAY





ECG


Initial ECG Impression Date:  Sep 13, 2022


Initial ECG Impression Time:  19:41


Initial ECG Rate:  87


Initial ECG Rhythm:  Normal Sinus





Diagnostic Imaging





Comments


CXR--PER RADIOLOGIST REPORT AT 2105





The heart and mediastinal silhouette are normal in appearance.


The lungs are clear. There is no pneumothorax or pleural fluid.


There is a sclerotic lesion in the left 4th rib which appears


unchanged from 08/08/2021.





IMPRESSION:


No acute infiltrate or pneumothorax or pleural fluid. Sclerotic


lesion in the left 4th rib appears unchanged compared to


08/08/2021, this may represent a metastatic lesion.





Departure


Communication (Admissions)


2125--SPOKE WITH DR. LAINEZ, HOSPITALIST, ACCEPTS PT FOR ADMIT. SHE WILL DO 

ADMIT ORDERS.





Impression





   Primary Impression:  


   COVID-19 virus infection


   Additional Impressions:  


   Prostate cancer metastatic to multiple sites


   Sepsis


   Dehydration


   Generalized weakness


   Chronic pain


Disposition:  09 ADMITTED AS INPATIENT


Condition:  Stable





Admissions


Decision to Admit Reason:  Admit from ER (General)


Decision to Admit/Date:  Sep 13, 2022


Time/Decision to Admit Time:  21:25





Departure-Patient Inst.


Referrals:  


JULIANA SALOMON MD (PCP/Family)


Primary Care Physician











DANNA DELGADILLO DO                 Sep 13, 2022 19:41

## 2022-09-13 NOTE — DIAGNOSTIC IMAGING REPORT
INDICATION: Fever



Frontal chest obtained at 08:49 p.m. and compared to 08/08/2021. 



The heart and mediastinal silhouette are normal in appearance.

The lungs are clear. There is no pneumothorax or pleural fluid.

There is a sclerotic lesion in the left 4th rib which appears

unchanged from 08/08/2021.



IMPRESSION:

No acute infiltrate or pneumothorax or pleural fluid. Sclerotic

lesion in the left 4th rib appears unchanged compared to

08/08/2021, this may represent a metastatic lesion.



Dictated by: 



  Dictated on workstation # [id number]

## 2022-09-14 VITALS — SYSTOLIC BLOOD PRESSURE: 133 MMHG | DIASTOLIC BLOOD PRESSURE: 60 MMHG

## 2022-09-14 VITALS — SYSTOLIC BLOOD PRESSURE: 131 MMHG | DIASTOLIC BLOOD PRESSURE: 69 MMHG

## 2022-09-14 VITALS — SYSTOLIC BLOOD PRESSURE: 123 MMHG | DIASTOLIC BLOOD PRESSURE: 64 MMHG

## 2022-09-14 VITALS — SYSTOLIC BLOOD PRESSURE: 119 MMHG | DIASTOLIC BLOOD PRESSURE: 58 MMHG

## 2022-09-14 VITALS — DIASTOLIC BLOOD PRESSURE: 61 MMHG | SYSTOLIC BLOOD PRESSURE: 113 MMHG

## 2022-09-14 LAB
ALBUMIN SERPL-MCNC: 3 GM/DL (ref 3.2–4.5)
ALP SERPL-CCNC: 82 U/L (ref 40–136)
ALT SERPL-CCNC: 29 U/L (ref 0–55)
BASOPHILS # BLD AUTO: 0 10^3/UL (ref 0–0.1)
BASOPHILS NFR BLD AUTO: 0 % (ref 0–10)
BILIRUB SERPL-MCNC: 0.5 MG/DL (ref 0.1–1)
BUN/CREAT SERPL: 22
CALCIUM SERPL-MCNC: 8.3 MG/DL (ref 8.5–10.1)
CHLORIDE SERPL-SCNC: 102 MMOL/L (ref 98–107)
CO2 SERPL-SCNC: 23 MMOL/L (ref 21–32)
CREAT SERPL-MCNC: 0.85 MG/DL (ref 0.6–1.3)
EOSINOPHIL # BLD AUTO: 0 10^3/UL (ref 0–0.3)
EOSINOPHIL NFR BLD AUTO: 1 % (ref 0–10)
GFR SERPLBLD BASED ON 1.73 SQ M-ARVRAT: 95 ML/MIN
GLUCOSE SERPL-MCNC: 101 MG/DL (ref 70–105)
HCT VFR BLD CALC: 35 % (ref 40–54)
HGB BLD-MCNC: 11.2 G/DL (ref 13.3–17.7)
LYMPHOCYTES # BLD AUTO: 0.3 10^3/UL (ref 1–4)
LYMPHOCYTES NFR BLD AUTO: 5 % (ref 12–44)
MANUAL DIFFERENTIAL PERFORMED BLD QL: NO
MCH RBC QN AUTO: 29 PG (ref 25–34)
MCHC RBC AUTO-ENTMCNC: 32 G/DL (ref 32–36)
MCV RBC AUTO: 90 FL (ref 80–99)
MONOCYTES # BLD AUTO: 0.5 10^3/UL (ref 0–1)
MONOCYTES NFR BLD AUTO: 8 % (ref 0–12)
NEUTROPHILS # BLD AUTO: 4.8 10^3/UL (ref 1.8–7.8)
NEUTROPHILS NFR BLD AUTO: 85 % (ref 42–75)
PLATELET # BLD: 111 10^3/UL (ref 130–400)
PMV BLD AUTO: 10.3 FL (ref 9–12.2)
POTASSIUM SERPL-SCNC: 4.1 MMOL/L (ref 3.6–5)
PROT SERPL-MCNC: 5.6 GM/DL (ref 6.4–8.2)
SODIUM SERPL-SCNC: 138 MMOL/L (ref 135–145)
WBC # BLD AUTO: 5.7 10^3/UL (ref 4.3–11)

## 2022-09-14 RX ADMIN — ENOXAPARIN SODIUM SCH MG: 100 INJECTION SUBCUTANEOUS at 01:30

## 2022-09-14 RX ADMIN — SODIUM CHLORIDE SCH MLS/HR: 900 INJECTION, SOLUTION INTRAVENOUS at 09:34

## 2022-09-14 NOTE — SHORT STAY SUMMARY-HOSPITALIST
History of Present Illness


HPI/Chief Complaint


Rock Chavez is a 67 year old male with metastatic prostate cancer and renal 

cell carcinoma who presented with weakness. He also reports cough. He denies 

shortness of breath. He has body and joint aches. He has still been able to 

ambulate. He has been able to eat and drink. He denies fevers and chills. He 

denies chest pain. He denies abdominal pain, nausea, vomiting, and diarrhea.


Source:  patient


Exam Limitations:  no limitations


Date Seen


9/14/22


Time Seen by a Provider:  12:00


Attending Physician


Ramsey Aragon MD


PCP


Admitting Physician:


Anna Marie Holguin DO 








Attending Physician:


Anna Marie Holguin DO


Referring Physician





Date of Admission


Sep 13, 2022 at 21:27





Home Medications & Allergies


Home Medications


Reviewed patient Home Medication Reconciliation performed by pharmacy medication

reconciliations technician and/or nursing.


Patients Allergies have been reviewed.





Allergies





Allergies


Coded Allergies


  No Known Drug Allergies (Onmgmgwcke10/18/11)








Past Medical-Social-Family Hx


Patient Social History


Tobacco Use?:  Yes


Tobacco type used:  Cigarettes


Smoking Status:  Current Everyday Smoker


Use of E-Cig and/or Vaping dev:  No


Substance use?:  No


Alcohol Use?:  Yes


Alcohol type:  Beer, Hard Liquor


Alcohol Frequency:  Once in a while


Pt feels they are or have been:  No





Immunizations Up To Date


First/Initial COVID19 Vaccinat:  FEB 2021


Second COVID19 Vaccination Ovi:  MARCH 2021





Seasonal Allergies


Seasonal Allergies:  No





Current Status


Advance Directives:  No


Communicates:  Verbally


Primary Language:  English


Preferred Spoken Language:  English


Is interpretation needed?:  No





Past Medical History


Surgeries:  Cardiac, Coronary Stent, Orthopedic


Pulmonary Embolism


Coronary Artery Disease, High Cholesterol, Hypertension


Prostate, Bone, Kidney


Blood Disorders:  No





Family Medical History


No Pertinent Family Hx





Review of Systems


Constitutional:  malaise, weakness


EENTM:  no symptoms reported


Respiratory:  cough


Cardiovascular:  no symptoms reported


Gastrointestinal:  no symptoms reported


Genitourinary:  no symptoms reported





Physical Exam


Physical Exam


Vital Signs





Vital Signs - First Documented








 9/13/22 9/13/22 9/13/22





 19:29 22:10 22:14


 


Temp 37.9  


 


Pulse 93  


 


Resp 22  


 


B/P (MAP) 131/75 (93)  


 


Pulse Ox 95  


 


O2 Delivery Room Air  


 


O2 Flow Rate  2.00 


 


FiO2   21





Capillary Refill : Less Than 3 Seconds


Height, Weight, BMI


Height: 5'9.00"


Weight: 190lbs. oz. 86.227902yc; 26.29 BMI


Method:Stated


General Appearance:  No Apparent Distress, WD/WN, Chronically ill


HEENT:  PERRL/EOMI, Pharynx Normal


Neck:  Normal Inspection, Supple


Respiratory:  Lungs Clear, Normal Breath Sounds, No Respiratory Distress


Cardiovascular:  Regular Rate, Rhythm, No Edema, Normal Peripheral Pulses


Gastrointestinal:  Normal Bowel Sounds, Non Tender, Soft


Extremity:  Normal Inspection, No Pedal Edema


Neurologic/Psychiatric:  Alert, Normal Mood/Affect


Skin:  Normal Color, Warm/Dry





Results


Results/Procedures


Labs


Laboratory Tests


9/13/22 19:30








9/14/22 05:37








Patient resulted labs reviewed.


Imaging:  Reviewed Imaging Report





Short Stay Diagnosis


Discharge Diagnosis-Short Stay


Admission Diagnosis


COVID-19


Final Discharge Diagnosis


COVID-19





Conclusion


Plan


COVID-19


   No supplemental oxygen requirement


   Supportive care recommended





SULMA


   Resolved with IV fluids





Debility


   Home health care





Metastatic prostate cancer


Renal cell carcinoma


Pain of metastatic malignancy





Diagnosis/Problems


Diagnosis/Problems





(1) COVID-19 virus infection


Status:  Acute


(2) Prostate cancer metastatic to multiple sites


Status:  Acute


(3) Renal cell carcinoma


Status:  Acute


(4) Debility


Status:  Acute











CHERRY JERONIMO MD              Sep 14, 2022 18:24

## 2022-09-14 NOTE — D/C HH FACE TO FACE ORDER
D/C  Face to Face Orders


Reconcile Patient Problems


Problems Reviewed?:  Yes





Instructions for Patient


[facility] [employer], [phone]


Patient Instructions/FollowUp:  


Take medications as prescribed. Follow up with your PCP. Return with


worsening shortness of breath, weakness, or if you feel like you are


getting worse.


Physician to follow Patient:  Aragon


Discharge Diet for Home:  No Restrictions





Patient Data-Allergies,Ht & Wt


Patient Allergies:  


Coded Allergies:  


     No Known Drug Allergies (Unverified , 12/18/11)


Height (Feet):  5


Height (Inches):  9.00


Weight (Pounds):  190





Home Health Need/Face to Face


Date of Face to Face:  Sep 14, 2022


Clinical Findings:  Generalized weakness and fatigue, Immune-compromised, 

Instability, Muscle weakness, Unsteady gait


I have seen Pt face-to-face:  Yes


Discharged To:  Home


Diagnosis/Conditions:  


COVID


Renal cell carcinoma


Metastatic prostate cancer


Debility


Problems/Diagnosis/Condition:  


(1) COVID-19 virus infection


(2) Renal cell carcinoma


(3) Prostate cancer metastatic to multiple sites


(4) Debility


Patient is Homebound due to:  Nguyen fall risk due to instabilty, Muscle weakness


Homebound Status


   Due to the above stated illness, injury or surgical procedure (medical 

condition or diagnosis) and associated clinical findings, the patient is 

homebound because of his/her inability to leave home except with aid of a 

supportive device and/or person AND leaving the home requires a considerable and

taxing effort or is medically contraindicated.


Pt req the following assistanc:  Aid of another person





Home Health Nursing Orders


Home Health Services Order:  Nursing Services, Occupational Ther-Evaluate & 

Treat, Physical Therapy-Evaluate & Treat





Home Health Infusion Therapy


Line Start Date:  Sep 13, 2022





Therapy Orders


Therapy Orders:  OT (must have SN or PT order), Physical Therapy


Therapy Specific Orders:  Eval assistive deivces, Teach enviro 

modifications/safety, Gait training, Increase strength/endurance


[facility]


I certify that this patient is under my care and that I, a nurse practitioner or

a physician; a assistant working with me, had a face to face encounter that -

meets the physician face to face encounter requirements with this patient as 

dated.











CHERRY JERONIMO MD              Sep 14, 2022 16:09

## 2022-10-12 ENCOUNTER — HOSPITAL ENCOUNTER (OUTPATIENT)
Dept: HOSPITAL 75 - LAB | Age: 67
End: 2022-10-12
Attending: PHYSICIAN ASSISTANT
Payer: MEDICARE

## 2022-10-12 DIAGNOSIS — C61: Primary | ICD-10-CM

## 2022-10-12 DIAGNOSIS — C64.1: ICD-10-CM

## 2022-10-12 LAB
ALBUMIN SERPL-MCNC: 3.5 GM/DL (ref 3.2–4.5)
ALP SERPL-CCNC: 113 U/L (ref 40–136)
ALT SERPL-CCNC: 18 U/L (ref 0–55)
BASOPHILS # BLD AUTO: 0 10^3/UL (ref 0–0.1)
BASOPHILS NFR BLD AUTO: 0 % (ref 0–10)
BILIRUB SERPL-MCNC: 0.4 MG/DL (ref 0.1–1)
BUN/CREAT SERPL: 20
CALCIUM SERPL-MCNC: 9.4 MG/DL (ref 8.5–10.1)
CHLORIDE SERPL-SCNC: 99 MMOL/L (ref 98–107)
CO2 SERPL-SCNC: 30 MMOL/L (ref 21–32)
CREAT SERPL-MCNC: 0.76 MG/DL (ref 0.6–1.3)
EOSINOPHIL # BLD AUTO: 0 10^3/UL (ref 0–0.3)
EOSINOPHIL NFR BLD AUTO: 1 % (ref 0–10)
GFR SERPLBLD BASED ON 1.73 SQ M-ARVRAT: 98 ML/MIN
GLUCOSE SERPL-MCNC: 95 MG/DL (ref 70–105)
HCT VFR BLD CALC: 31 % (ref 40–54)
HGB BLD-MCNC: 9.5 G/DL (ref 13.3–17.7)
LYMPHOCYTES # BLD AUTO: 0.4 10^3/UL (ref 1–4)
LYMPHOCYTES NFR BLD AUTO: 11 % (ref 12–44)
MANUAL DIFFERENTIAL PERFORMED BLD QL: NO
MCH RBC QN AUTO: 28 PG (ref 25–34)
MCHC RBC AUTO-ENTMCNC: 31 G/DL (ref 32–36)
MCV RBC AUTO: 89 FL (ref 80–99)
MONOCYTES # BLD AUTO: 0.3 10^3/UL (ref 0–1)
MONOCYTES NFR BLD AUTO: 9 % (ref 0–12)
NEUTROPHILS # BLD AUTO: 2.7 10^3/UL (ref 1.8–7.8)
NEUTROPHILS NFR BLD AUTO: 79 % (ref 42–75)
PLATELET # BLD: 298 10^3/UL (ref 130–400)
PMV BLD AUTO: 9.1 FL (ref 9–12.2)
POTASSIUM SERPL-SCNC: 4 MMOL/L (ref 3.6–5)
PROT SERPL-MCNC: 6.8 GM/DL (ref 6.4–8.2)
SODIUM SERPL-SCNC: 138 MMOL/L (ref 135–145)
WBC # BLD AUTO: 3.4 10^3/UL (ref 4.3–11)

## 2022-10-12 PROCEDURE — 85025 COMPLETE CBC W/AUTO DIFF WBC: CPT

## 2022-10-12 PROCEDURE — 36415 COLL VENOUS BLD VENIPUNCTURE: CPT

## 2022-10-12 PROCEDURE — 80053 COMPREHEN METABOLIC PANEL: CPT

## 2022-11-08 ENCOUNTER — HOSPITAL ENCOUNTER (OUTPATIENT)
Dept: HOSPITAL 75 - RAD | Age: 67
End: 2022-11-08
Attending: INTERNAL MEDICINE
Payer: MEDICARE

## 2022-11-08 DIAGNOSIS — C61: ICD-10-CM

## 2022-11-08 DIAGNOSIS — C64.1: ICD-10-CM

## 2022-11-08 DIAGNOSIS — G31.9: Primary | ICD-10-CM

## 2022-11-08 PROCEDURE — 70553 MRI BRAIN STEM W/O & W/DYE: CPT

## 2022-11-08 NOTE — DIAGNOSTIC IMAGING REPORT
PROCEDURE: MR imaging of the brain with and without contrast.



TECHNIQUE: Multiplanar, multisequence MR imaging of the brain was

performed with and without contrast.



INDICATION:  Prostate cancer with weakness, difficulty walking.



Compared with brain MRI 07/20/2021, that exam showing some

atrophy but no evidence of metastatic disease.



FINDINGS:



Mild generalized cerebral cortical atrophy showed no substantial

interval progression, periventricular and subcortical white

matter disease, T2 hyperintense and most conspicuous on the FLAIR

weighted pulse sequences shows a very mild interval progression. 

No findings of focal nor generalized cerebral edema. There is no

hemorrhage. After IV contrast there was no abnormal parenchymal

or meningeal enhancement.  No findings of intracranial

involvement by neoplasm, primary or metastatic. No suspicious

enhancement or signal distortion to the calvarium. There is no

hemorrhage.  There are no abnormal extra-axial fluid collections.

 There were no findings of an infarct. The orbits and sinuses

nonacute.



IMPRESSION:  Chronic senescent atrophy and periventricular white

matter disease showed slight interval progression. However no

findings of infarct, hemorrhage or metastatic disease and no

acute-appearing abnormality.



Dictated by: 



  Dictated on workstation # [id number]

## 2024-11-05 NOTE — PHYSICAL THERAPY EVALUATION
PT Evaluation-General


Medical Diagnosis


Admission Date


Sep 13, 2022 at 21:27


Medical Diagnosis:  covid-19


Onset Date:  Sep 13, 2022





Therapy Diagnosis


Therapy Diagnosis:  impaired mobility, strength





Height/Weight


Height (Feet):  5


Height (Inches):  9.00


Weight (Pounds):  190





Precautions


Precautions/Isolations:  Contact Isolation, Droplet Isolation, Fall Prevention





Referral


Physician:  Renae


Reason for Referral:  Evaluation/Treatment





Medical History


Additional Medical History


Past Medical History


Surgeries:  Yes (neck)


Cardiac, Coronary Stent, Orthopedic


Respiratory:  Yes


Pulmonary Embolism


Cardiac:  Yes (stent placement 10 years ago)


Coronary Artery Disease, High Cholesterol, Hypertension


Neurological:  No


Reproductive Disorders:  No


Genitourinary:  Yes (METASTATIC PROSTATE CANCER)


Gastrointestinal:  No


Musculoskeletal:  Yes (CHRONIC GENERALIZED PAIN/ METASTATIC CANCER)


Endocrine:  No


HEENT:  No


Cancer:  Yes


Prostate, Bone, Kidney


Psychosocial:  No


Integumentary:  No


Blood Disorders:  No


Reviewed History:  Yes





Social History


Current Living Status:  Spouse





Prior


Prior Level of Function


SCALE: Activities may be completed with or without assistive devices.





6-Indepedent-patient completes the activity by him/herself with no assistance 

from a helper.


5-Set-up or Clean-up Assistance-helper sets up or cleans up; patient completes 

activity. [place] assists only prior to or  


    following the activity.


4-Supervision or Touching Assistance-helper provides verbal cues and/or 

touching/steadying and/or contact guard assistance as patient completes 

activity. Assistance may be provided   


    throughout the activity or intermittently.


3-Partial/Moderate Assistance-helper does LESS THAN HALF the effort. [place] 

lifts, holds or supports trunk or limbs, but provides less than half the effort.


2-Substantial/Maximal Assistance-helper does MORE THAN HALF the effort. [place] 

lifts or holds trunk or limbs and provides more than half the effort.


0-Seuazdgcf-odlttd does ALL the effort. Patient does none of the effort to 

complete the activity. Or, the assistance of 2 or more helpers is required for 

the patient to complete the  


    activity.


If activity was not attempted, code reason:


7-Patient Refused.


9-Not Applicable-not attempted and the patient did not perform the activity 

before the current illness, exacerbation or injury.


10-Not Attempted due to Environmental Limitations-(lack of equipment, weather 

restraints, etc.).


88-Not Attempted due to Medical Conditions or Safety Concerns.


Bed Mobility:  6


Transfers (B,C,W/C):  6


Gait:  6


Indoor Mobility (Ambulation):  Independent


cane





PT Evaluation-Current


Subjective


Patient in bed pre tx, agrees to PT, states he has some minor back pain.  Has 

telesitter in room.





Pt/Family Goals


to be independent at home





Objective


Patient Orientation:  Person, Place, Situation


Attachments:  IV





ROM/Strength


ROM Lower Extremities


WNL


Strength Lower Extremities


LLE (hip flexion 3+/5, knee flexion 4/5, knee extension 4/5, dorsiflexion 4/5), 

RLE (hip flexion 3+/5, knee flexion 4/5, knee extension 4/5, dorsiflexion 4/5)





Sensory


Vision:  Functional


Hearing:  Functional


Sensation Right Lower Extremit:  Intact


Sensation Left Lower Extremity:  Intact





Transfers


Roll Left to Right (QC):  6


Sit to Lying (QC):  4


Lying to Sitting/Side of Bed(Q:  3


Sit to Stand (QC):  4





Gait


Does the Patient Walk?:  Yes


Mode of Locomotion:  Walk


Anticipated Mode of Locomotion:  Walk


Walk 10 feet (QC):  4


Distance:  10'x2


Gait Assistive Device:  Cane Single Point


Comments/Gait Description


CGA, ambulated to toilet to urinate and then back to his bed.  Slightly unsteady

but no LOB.





Balance


Sitting Static:  Normal


Sitting Dynamic:  Normal


Standing Static:  Fair


 Standing Dynamic:  Poor





Treatment


BLE supine exercises x15 (AP, HS, QS)





Assessment/Needs


Patient in bed post tx with nurse call, phone, tray, bed alarm on, telesitter in

room.  Patient has impaired mobility and strength.  Needs min assist for sit to 

stand, CGA for ambulation.


Rehab Potential:  Fair





PT Long Term Goals


Long Term Goals


PT Long Term Goals Time Frame:  Sep 21, 2022


Roll Left & Right (QC):  6


Sit to Lying (QC):  6


Lying-Sitting on Side/Bed(QC):  6


Sit to Stand (QC):  6


Chair/Bed-to-Chair Xfer(QC):  6


Walk 10 feet (QC):  6


Walk 50ft with 2 Turns (QC):  6


Walk 150 ft (QC):  6





PT Plan


Problem List


Problem List:  Activity Tolerance, Functional Strength, Safety, Balance, Gait, 

Transfer, Bed Mobility, ROM





Treatment/Plan


Treatment Plan:  Continue Plan of Care


Treatment Plan:  Bed Mobility, Education, Functional Activity [place], Functional 

Strength, Gait, Safety, Therapeutic Exercise, Transfers


Treatment Duration:  Sep 21, 2022


Frequency:  6 times per week


Estimated Hrs Per Day:  .25 hour per day


Patient and/or Family Agrees t:  Yes





Safety Risks/Education


Patient Education:  Gait Training, Transfer Techniques, Correct Positioning, 

Safety Issues


Teaching Recipient:  Patient


Teaching Methods:  Demonstration, Discussion


Response to Teaching:  Reinforcement Needed





Discharge Recommendations


Plan


Patient will perform bed mobility and transfer training, balance and endurance 

training, functional strengthening, stair training, gait training, and 

education, to improve functional mobility and independence at home.


Therapy Discharge Recommendati:  Home & Family, Post Acute PT





Time/GCodes


Time In:  1417


Time Out:  1431


Total Billed Treatment Time:  14


Total Billed Treatment


1 visit


CARLOTTA NEWMAN PT                Sep 14, 2022 15:06 Rec'd sitting in bedside chair in NAD, VSS, +IV, +[place], +tele, agreeable to PT